# Patient Record
Sex: FEMALE | Race: WHITE | NOT HISPANIC OR LATINO | Employment: FULL TIME | URBAN - METROPOLITAN AREA
[De-identification: names, ages, dates, MRNs, and addresses within clinical notes are randomized per-mention and may not be internally consistent; named-entity substitution may affect disease eponyms.]

---

## 2018-06-13 DIAGNOSIS — E11.8 TYPE 2 DIABETES MELLITUS WITH COMPLICATION, UNSPECIFIED WHETHER LONG TERM INSULIN USE: Primary | ICD-10-CM

## 2018-07-13 ENCOUNTER — OFFICE VISIT (OUTPATIENT)
Dept: ENDOCRINOLOGY | Facility: HOSPITAL | Age: 53
End: 2018-07-13
Payer: COMMERCIAL

## 2018-07-13 VITALS
HEIGHT: 62 IN | SYSTOLIC BLOOD PRESSURE: 132 MMHG | HEART RATE: 72 BPM | WEIGHT: 183 LBS | DIASTOLIC BLOOD PRESSURE: 80 MMHG | BODY MASS INDEX: 33.68 KG/M2

## 2018-07-13 DIAGNOSIS — E78.5 HYPERLIPIDEMIA, UNSPECIFIED HYPERLIPIDEMIA TYPE: ICD-10-CM

## 2018-07-13 DIAGNOSIS — E11.8 TYPE 2 DIABETES MELLITUS WITH COMPLICATION, WITHOUT LONG-TERM CURRENT USE OF INSULIN (HCC): Primary | ICD-10-CM

## 2018-07-13 DIAGNOSIS — E11.8 TYPE 2 DIABETES MELLITUS WITH COMPLICATION, UNSPECIFIED WHETHER LONG TERM INSULIN USE: ICD-10-CM

## 2018-07-13 DIAGNOSIS — I10 ESSENTIAL HYPERTENSION: ICD-10-CM

## 2018-07-13 DIAGNOSIS — E03.9 HYPOTHYROIDISM, UNSPECIFIED TYPE: ICD-10-CM

## 2018-07-13 PROCEDURE — 99204 OFFICE O/P NEW MOD 45 MIN: CPT | Performed by: INTERNAL MEDICINE

## 2018-07-13 RX ORDER — LORAZEPAM 0.5 MG/1
TABLET ORAL
Refills: 0 | COMMUNITY
Start: 2018-06-01

## 2018-07-13 RX ORDER — ATORVASTATIN CALCIUM 40 MG/1
40 TABLET, FILM COATED ORAL DAILY
Qty: 30 TABLET | Refills: 11 | Status: SHIPPED | OUTPATIENT
Start: 2018-07-13 | End: 2019-04-24 | Stop reason: SDUPTHER

## 2018-07-13 RX ORDER — PIOGLITAZONEHYDROCHLORIDE 15 MG/1
15 TABLET ORAL DAILY
Qty: 30 TABLET | Refills: 11 | Status: SHIPPED | OUTPATIENT
Start: 2018-07-13 | End: 2019-04-24 | Stop reason: SDUPTHER

## 2018-07-13 RX ORDER — ATORVASTATIN CALCIUM 40 MG/1
40 TABLET, FILM COATED ORAL DAILY
Refills: 0 | COMMUNITY
Start: 2018-07-12 | End: 2018-07-13 | Stop reason: SDUPTHER

## 2018-07-13 RX ORDER — LOSARTAN POTASSIUM 50 MG/1
50 TABLET ORAL DAILY
Qty: 30 TABLET | Refills: 11 | Status: SHIPPED | OUTPATIENT
Start: 2018-07-13 | End: 2019-04-24 | Stop reason: SDUPTHER

## 2018-07-13 RX ORDER — EXENATIDE 2 MG/.65ML
2 INJECTION, SUSPENSION, EXTENDED RELEASE SUBCUTANEOUS WEEKLY
Refills: 1 | COMMUNITY
Start: 2018-06-11 | End: 2018-07-13 | Stop reason: SDUPTHER

## 2018-07-13 RX ORDER — PIOGLITAZONEHYDROCHLORIDE 15 MG/1
15 TABLET ORAL DAILY
Refills: 0 | COMMUNITY
Start: 2018-07-12 | End: 2018-07-13 | Stop reason: SDUPTHER

## 2018-07-13 RX ORDER — MULTIVITAMIN
1 CAPSULE ORAL DAILY
COMMUNITY

## 2018-07-13 RX ORDER — LEVOTHYROXINE SODIUM 0.15 MG/1
150 TABLET ORAL DAILY
Qty: 30 TABLET | Refills: 11 | Status: SHIPPED | OUTPATIENT
Start: 2018-07-13 | End: 2019-04-24 | Stop reason: SDUPTHER

## 2018-07-13 RX ORDER — LOSARTAN POTASSIUM 50 MG/1
50 TABLET ORAL DAILY
Refills: 0 | COMMUNITY
Start: 2018-05-29 | End: 2018-07-13 | Stop reason: SDUPTHER

## 2018-07-13 RX ORDER — BUPROPION HYDROCHLORIDE 300 MG/1
300 TABLET ORAL DAILY
Refills: 0 | COMMUNITY
Start: 2018-06-12

## 2018-07-13 RX ORDER — ZOLPIDEM TARTRATE 10 MG/1
TABLET ORAL
Refills: 0 | COMMUNITY
Start: 2018-06-01 | End: 2020-04-15 | Stop reason: ALTCHOICE

## 2018-07-13 RX ORDER — ASPIRIN 81 MG/1
81 TABLET ORAL DAILY
COMMUNITY

## 2018-07-13 RX ORDER — EXENATIDE 2 MG/.65ML
2 INJECTION, SUSPENSION, EXTENDED RELEASE SUBCUTANEOUS WEEKLY
Qty: 4 EACH | Refills: 11 | Status: SHIPPED | OUTPATIENT
Start: 2018-07-13 | End: 2019-02-11 | Stop reason: CLARIF

## 2018-07-13 RX ORDER — LEVOTHYROXINE SODIUM 0.15 MG/1
150 TABLET ORAL DAILY
Refills: 0 | COMMUNITY
Start: 2018-07-12 | End: 2018-07-13 | Stop reason: SDUPTHER

## 2018-07-13 NOTE — PROGRESS NOTES
7/13/2018    Assessment/Plan      Diagnoses and all orders for this visit:    Type 2 diabetes mellitus with complication, without long-term current use of insulin ([facility] )  -     HEMOGLOBIN A1C W/ EAG ESTIMATION Lab Collect; Future  -     Comprehensive metabolic panel Lab Collect; Future  -     Microalbumin / creatinine urine ratio- Lab Collect; Future  -     pioglitazone (ACTOS) 15 mg tablet; Take 1 tablet (15 mg total) by mouth daily  -     HEMOGLOBIN A1C W/ EAG ESTIMATION Lab Collect; Future  -     BYDUREON 2 MG PEN; Inject 2 mg under the skin once a week    Hypothyroidism, unspecified type  -     TSH, 3rd generation Lab Collect; Future  -     T4, free Lab Collect; Future  -     levothyroxine 150 mcg tablet; Take 1 tablet (150 mcg total) by mouth daily  -     TSH, 3rd generation Lab Collect; Future  -     T4, free Lab Collect; Future    Hyperlipidemia, unspecified hyperlipidemia type  -     Lipid Panel with Direct LDL reflex Lab Collect; Future  -     atorvastatin (LIPITOR) 40 mg tablet; Take 1 tablet (40 mg total) by mouth daily    Essential hypertension  -     losartan (COZAAR) 50 mg tablet; Take 1 tablet (50 mg total) by mouth daily    Type 2 diabetes mellitus with complication, unspecified whether long term insulin use (HCC)  -     canagliflozin (INVOKANA) 100 mg; Take 1 tablet (100 mg total) by mouth daily before breakfast    Other orders  -     Discontinue: atorvastatin (LIPITOR) 40 mg tablet; Take 40 mg by mouth daily  -     buPROPion (WELLBUTRIN XL) 300 mg 24 hr tablet; Take 300 mg by mouth daily  -     Discontinue: BYDUREON 2 MG PEN; Inject 2 mg under the skin once a week  -     Discontinue: levothyroxine 150 mcg tablet; Take 150 mcg by mouth daily  -     LORazepam (ATIVAN) 0 5 mg tablet;   -     Discontinue: losartan (COZAAR) 50 mg tablet; Take 50 mg by mouth daily  -     Discontinue: pioglitazone (ACTOS) 15 mg tablet;  Take 15 mg by mouth daily  -     zolpidem (AMBIEN) 10 mg tablet;   -     aspirin (ECOTRIN LOW STRENGTH) 81 mg EC tablet; Take 81 mg by mouth daily  -     Multiple Vitamin (MULTIVITAMIN) capsule; Take 1 capsule by mouth daily        Assessment/Plan:  1  Type 2 diabetes without complication:  Will check labs as ordered above and call her with the results  Continue Actos 15 mg daily, Invokana 100 mg daily and Bydureon 2 mg daily  We switch to the bcise pen for Bydureon  Plan to follow-up in 6 months, but if there are any abnormalities in her blood work we can see her back sooner  2   Hypertension:  Continue losartan 50 mg daily  3   Hyperlipidemia:  Continue Lipitor 40 mg daily  4   Hypothyroidism:  Continue levothyroxine 150 mcg daily and check TSH free T4  We will call her with these results  CC: Diabetes Consult    History of Present Illness     HPI: Perez Christianson is a 47y o  year old female with type 2 diabetes for about 10 years  She is on oral agents at home and takes Actos 15 mg daily, Invokana 100 mg daily, Bydureon 2 mg weekly  She denies any polyuria, polydipsia, nocturia and blurry vision  She denies neuropathy, nephropathy and retinopathy  In the past has been on Metformin (GI side effects) and Januvia  Hypoglycemic episodes: Yes but has glucose tabs in case  Follows with eye doctor annually  Blood Sugar/Glucometer/Pump/CGM review: No logs to review today  HTN/HLD/Hypothyroid: Takes losartan 50 mg daily for blood pressure  Atorvastatin 40 mg daily for cholesterol  She is on 150 mcg of levothyroxine daily  Clinically she is doing well and denies any significant symptoms of hypothyroidism or hyperthyroidism  Review of Systems   Constitutional: Negative for chills, fatigue and fever  HENT: Negative for trouble swallowing and voice change  Eyes: Negative for visual disturbance  Respiratory: Negative for shortness of breath  Cardiovascular: Negative for chest pain, palpitations and leg swelling     Gastrointestinal: Negative for abdominal pain, nausea and vomiting  Endocrine: Negative for polydipsia and polyuria  Musculoskeletal: Negative for arthralgias and myalgias  Skin: Negative for rash  Neurological: Negative for dizziness, tremors and weakness  Hematological: Negative for adenopathy  Psychiatric/Behavioral: Negative for agitation and confusion  Historical Information   History reviewed  No pertinent past medical history  History reviewed  No pertinent surgical history  Social History   History   Alcohol use Not on file     History   Drug use: Unknown     History   Smoking Status    Current Every Day Smoker    Packs/day: 0 50    Years: 30 00    Types: Cigarettes   Smokeless Tobacco    Never Used     Family History:   Family History   Problem Relation Age of Onset    Schizoaffective Disorder  Mother     Diabetes unspecified Father     Hyperlipidemia Father     Hypertension Father        Meds/Allergies   Current Outpatient Prescriptions   Medication Sig Dispense Refill    aspirin (ECOTRIN LOW STRENGTH) 81 mg EC tablet Take 81 mg by mouth daily      atorvastatin (LIPITOR) 40 mg tablet Take 1 tablet (40 mg total) by mouth daily 30 tablet 11    buPROPion (WELLBUTRIN XL) 300 mg 24 hr tablet Take 300 mg by mouth daily  0    BYDUREON 2 MG PEN Inject 2 mg under the skin once a week 4 each 11    canagliflozin (INVOKANA) 100 mg Take 1 tablet (100 mg total) by mouth daily before breakfast 30 tablet 11    levothyroxine 150 mcg tablet Take 1 tablet (150 mcg total) by mouth daily 30 tablet 11    LORazepam (ATIVAN) 0 5 mg tablet   0    losartan (COZAAR) 50 mg tablet Take 1 tablet (50 mg total) by mouth daily 30 tablet 11    Multiple Vitamin (MULTIVITAMIN) capsule Take 1 capsule by mouth daily      pioglitazone (ACTOS) 15 mg tablet Take 1 tablet (15 mg total) by mouth daily 30 tablet 11    zolpidem (AMBIEN) 10 mg tablet   0     No current facility-administered medications for this visit        No Known Allergies    Objective   Vitals: Blood pressure 132/80, pulse 72, height 5' 2" (1 575 m), weight 83 kg (183 lb)  Invasive Devices          No matching active lines, drains, or airways          Physical Exam   Constitutional: She is oriented to person, place, and time  She appears well-developed and well-nourished  No distress  HENT:   Head: Normocephalic and atraumatic  Mouth/Throat: No oropharyngeal exudate  Eyes: Conjunctivae and EOM are normal  Pupils are equal, round, and reactive to light  No scleral icterus  Neck: Normal range of motion  Neck supple  No thyromegaly present  Cardiovascular: Normal rate and regular rhythm  No murmur heard  Pulses:       Dorsalis pedis pulses are 2+ on the right side, and 2+ on the left side  Posterior tibial pulses are 2+ on the right side, and 2+ on the left side  Pulmonary/Chest: Effort normal and breath sounds normal  She has no wheezes  Abdominal: Soft  Bowel sounds are normal  She exhibits no distension  There is no tenderness  Musculoskeletal: Normal range of motion  She exhibits no edema  Feet:   Right Foot:   Skin Integrity: Negative for ulcer, skin breakdown, erythema, warmth, callus or dry skin  Left Foot:   Skin Integrity: Negative for ulcer, skin breakdown, erythema, warmth, callus or dry skin  Neurological: She is alert and oriented to person, place, and time  She exhibits normal muscle tone  Skin: Skin is warm and dry  No rash noted  She is not diaphoretic  Psychiatric: She has a normal mood and affect  Her behavior is normal      Patient's shoes and socks removed  Right Foot/Ankle   Right Foot Inspection  Skin Exam: skin normal and skin intact no dry skin, no warmth, no callus, no erythema, no maceration, no abnormal color, no pre-ulcer, no ulcer and no callus                            Sensory   Vibration: intact    Monofilament testing: intact  Vascular    The right DP pulse is 2+  The right PT pulse is 2+       Left Foot/Ankle  Left Foot Inspection  Skin Exam: skin normal and skin intactno dry skin, no warmth, no erythema, no maceration, normal color, no pre-ulcer, no ulcer and no callus                                         Sensory   Vibration: intact    Monofilament: intact  Vascular    The left DP pulse is 2+  The left PT pulse is 2+  The history was obtained from the review of the chart and from the patient  Lab Results:   No recent labs  No future appointments

## 2018-07-13 NOTE — LETTER
July 13, 2018     [address] [facility] [address]  [address] [facility]    Patient: Carson Winters   YOB: 1965   Date of Visit: 7/13/2018       Dear Dr Lemmie Goldberg: Thank you for referring Bran Schneider to me for evaluation  Below are my notes for this consultation  If you have questions, please do not hesitate to call me  I look forward to following your patient along with you  Sincerely,        Miles Coronado DO        CC: No Recipients  Miles Coronado DO  7/13/2018 11:37 AM  Sign at close encounter  7/13/2018    Assessment/Plan      Diagnoses and all orders for this visit:    Type 2 diabetes mellitus with complication, without long-term current use of insulin ([facility] [address] )  -     HEMOGLOBIN A1C W/ EAG ESTIMATION Lab Collect; Future  -     Comprehensive metabolic panel Lab Collect; Future  -     Microalbumin / creatinine urine ratio- Lab Collect; Future  -     pioglitazone (ACTOS) 15 mg tablet; Take 1 tablet (15 mg total) by mouth daily  -     HEMOGLOBIN A1C W/ EAG ESTIMATION Lab Collect; Future  -     BYDUREON 2 MG PEN; Inject 2 mg under the skin once a week    Hypothyroidism, unspecified type  -     TSH, 3rd generation Lab Collect; Future  -     T4, free Lab Collect; Future  -     levothyroxine 150 mcg tablet; Take 1 tablet (150 mcg total) by mouth daily  -     TSH, 3rd generation Lab Collect; Future  -     T4, free Lab Collect; Future    Hyperlipidemia, unspecified hyperlipidemia type  -     Lipid Panel with Direct LDL reflex Lab Collect; Future  -     atorvastatin (LIPITOR) 40 mg tablet; Take 1 tablet (40 mg total) by mouth daily    Essential hypertension  -     losartan (COZAAR) 50 mg tablet; Take 1 tablet (50 mg total) by mouth daily    Type 2 diabetes mellitus with complication, unspecified whether long term insulin use (HCC)  -     canagliflozin (INVOKANA) 100 mg;  Take 1 tablet (100 mg total) by mouth daily before breakfast    Other orders  -     Discontinue: atorvastatin (LIPITOR) 40 mg tablet; Take 40 mg by mouth daily  -     buPROPion (WELLBUTRIN XL) 300 mg 24 hr tablet; Take 300 mg by mouth daily  -     Discontinue: BYDUREON 2 MG PEN; Inject 2 mg under the skin once a week  -     Discontinue: levothyroxine 150 mcg tablet; Take 150 mcg by mouth daily  -     LORazepam (ATIVAN) 0 5 mg tablet;   -     Discontinue: losartan (COZAAR) 50 mg tablet; Take 50 mg by mouth daily  -     Discontinue: pioglitazone (ACTOS) 15 mg tablet; Take 15 mg by mouth daily  -     zolpidem (AMBIEN) 10 mg tablet;   -     aspirin (ECOTRIN LOW STRENGTH) 81 mg EC tablet; Take 81 mg by mouth daily  -     Multiple Vitamin (MULTIVITAMIN) capsule; Take 1 capsule by mouth daily        Assessment/Plan:  1  Type 2 diabetes without complication:  Will check labs as ordered above and call her with the results  Continue Actos 15 mg daily, Invokana 100 mg daily and Bydureon 2 mg daily  We switch to the bcise pen for Bydureon  Plan to follow-up in 6 months, but if there are any abnormalities in her blood work we can see her back sooner  2   Hypertension:  Continue losartan 50 mg daily  3   Hyperlipidemia:  Continue Lipitor 40 mg daily  4   Hypothyroidism:  Continue levothyroxine 150 mcg daily and check TSH free T4  We will call her with these results  CC: Diabetes Consult    History of Present Illness     HPI: Keshia Baptiste is a 47y o  year old female with type 2 diabetes for about 10 years  She is on oral agents at home and takes Actos 15 mg daily, Invokana 100 mg daily, Bydureon 2 mg weekly  She denies any polyuria, polydipsia, nocturia and blurry vision  She denies neuropathy, nephropathy and retinopathy  In the past has been on Metformin (GI side effects) and Januvia  Hypoglycemic episodes: Yes but has glucose tabs in case  Follows with eye doctor annually  Blood Sugar/Glucometer/Pump/CGM review: No logs to review today      HTN/HLD/Hypothyroid: Takes losartan 50 mg daily for blood pressure  Atorvastatin 40 mg daily for cholesterol  She is on 150 mcg of levothyroxine daily  Clinically she is doing well and denies any significant symptoms of hypothyroidism or hyperthyroidism  Review of Systems   Constitutional: Negative for chills, fatigue and fever  HENT: Negative for trouble swallowing and voice change  Eyes: Negative for visual disturbance  Respiratory: Negative for shortness of breath  Cardiovascular: Negative for chest pain, palpitations and leg swelling  Gastrointestinal: Negative for abdominal pain, nausea and vomiting  Endocrine: Negative for polydipsia and polyuria  Musculoskeletal: Negative for arthralgias and myalgias  Skin: Negative for rash  Neurological: Negative for dizziness, tremors and weakness  Hematological: Negative for adenopathy  Psychiatric/Behavioral: Negative for agitation and confusion  Historical Information   History reviewed  No pertinent past medical history  History reviewed  No pertinent surgical history    Social History   History   Alcohol use Not on file     History   Drug use: Unknown     History   Smoking Status    Current Every Day Smoker    Packs/day: 0 50    Years: 30 00    Types: Cigarettes   Smokeless Tobacco    Never Used     Family History:   Family History   Problem Relation Age of Onset    Schizoaffective Disorder  Mother     Diabetes unspecified Father     Hyperlipidemia Father     Hypertension Father        Meds/Allergies   Current Outpatient Prescriptions   Medication Sig Dispense Refill    aspirin (ECOTRIN LOW STRENGTH) 81 mg EC tablet Take 81 mg by mouth daily      atorvastatin (LIPITOR) 40 mg tablet Take 1 tablet (40 mg total) by mouth daily 30 tablet 11    buPROPion (WELLBUTRIN XL) 300 mg 24 hr tablet Take 300 mg by mouth daily  0    BYDUREON 2 MG PEN Inject 2 mg under the skin once a week 4 each 11    canagliflozin (INVOKANA) 100 mg Take 1 tablet (100 mg total) by mouth daily before breakfast 30 tablet 11    levothyroxine 150 mcg tablet Take 1 tablet (150 mcg total) by mouth daily 30 tablet 11    LORazepam (ATIVAN) 0 5 mg tablet   0    losartan (COZAAR) 50 mg tablet Take 1 tablet (50 mg total) by mouth daily 30 tablet 11    Multiple Vitamin (MULTIVITAMIN) capsule Take 1 capsule by mouth daily      pioglitazone (ACTOS) 15 mg tablet Take 1 tablet (15 mg total) by mouth daily 30 tablet 11    zolpidem (AMBIEN) 10 mg tablet   0     No current facility-administered medications for this visit  No Known Allergies    Objective   Vitals: Blood pressure 132/80, pulse 72, height 5' 2" (1 575 m), weight 83 kg (183 lb)  Invasive Devices          No matching active lines, drains, or airways          Physical Exam   Constitutional: She is oriented to person, place, and time  She appears well-developed and well-nourished  No distress  HENT:   Head: Normocephalic and atraumatic  Mouth/Throat: No oropharyngeal exudate  Eyes: Conjunctivae and EOM are normal  Pupils are equal, round, and reactive to light  No scleral icterus  Neck: Normal range of motion  Neck supple  No thyromegaly present  Cardiovascular: Normal rate and regular rhythm  No murmur heard  Pulses:       Dorsalis pedis pulses are 2+ on the right side, and 2+ on the left side  Posterior tibial pulses are 2+ on the right side, and 2+ on the left side  Pulmonary/Chest: Effort normal and breath sounds normal  She has no wheezes  Abdominal: Soft  Bowel sounds are normal  She exhibits no distension  There is no tenderness  Musculoskeletal: Normal range of motion  She exhibits no edema  Feet:   Right Foot:   Skin Integrity: Negative for ulcer, skin breakdown, erythema, warmth, callus or dry skin  Left Foot:   Skin Integrity: Negative for ulcer, skin breakdown, erythema, warmth, callus or dry skin  Neurological: She is alert and oriented to person, place, and time  She exhibits normal muscle tone     Skin: Skin is warm and dry  No rash noted  She is not diaphoretic  Psychiatric: She has a normal mood and affect  Her behavior is normal      Patient's shoes and socks removed  Right Foot/Ankle   Right Foot Inspection  Skin Exam: skin normal and skin intact no dry skin, no warmth, no callus, no erythema, no maceration, no abnormal color, no pre-ulcer, no ulcer and no callus                            Sensory   Vibration: intact    Monofilament testing: intact  Vascular    The right DP pulse is 2+  The right PT pulse is 2+  Left Foot/Ankle  Left Foot Inspection  Skin Exam: skin normal and skin intactno dry skin, no warmth, no erythema, no maceration, normal color, no pre-ulcer, no ulcer and no callus                                         Sensory   Vibration: intact    Monofilament: intact  Vascular    The left DP pulse is 2+  The left PT pulse is 2+  The history was obtained from the review of the chart and from the patient  Lab Results:   No recent labs  No future appointments

## 2019-01-30 ENCOUNTER — TELEPHONE (OUTPATIENT)
Dept: ENDOCRINOLOGY | Facility: HOSPITAL | Age: 54
End: 2019-01-30

## 2019-01-30 LAB
ALBUMIN SERPL-MCNC: 4.8 G/DL (ref 3.5–5.5)
ALBUMIN/CREAT UR: 21.7 MG/G CREAT (ref 0–30)
ALBUMIN/GLOB SERPL: 1.9 {RATIO} (ref 1.2–2.2)
ALP SERPL-CCNC: 74 IU/L (ref 39–117)
ALT SERPL-CCNC: 33 IU/L (ref 0–32)
AST SERPL-CCNC: 22 IU/L (ref 0–40)
BILIRUB SERPL-MCNC: 0.6 MG/DL (ref 0–1.2)
BUN SERPL-MCNC: 16 MG/DL (ref 6–24)
BUN/CREAT SERPL: 21 (ref 9–23)
CALCIUM SERPL-MCNC: 9.7 MG/DL (ref 8.7–10.2)
CHLORIDE SERPL-SCNC: 100 MMOL/L (ref 96–106)
CHOLEST SERPL-MCNC: 188 MG/DL (ref 100–199)
CO2 SERPL-SCNC: 24 MMOL/L (ref 20–29)
CREAT SERPL-MCNC: 0.76 MG/DL (ref 0.57–1)
CREAT UR-MCNC: 170.5 MG/DL
EST. AVERAGE GLUCOSE BLD GHB EST-MCNC: 177 MG/DL
GLOBULIN SER-MCNC: 2.5 G/DL (ref 1.5–4.5)
GLUCOSE SERPL-MCNC: 152 MG/DL (ref 65–99)
HBA1C MFR BLD: 7.8 % (ref 4.8–5.6)
HDLC SERPL-MCNC: 45 MG/DL
LDLC SERPL CALC-MCNC: 93 MG/DL (ref 0–99)
MICROALBUMIN UR-MCNC: 37 UG/ML
POTASSIUM SERPL-SCNC: 4.3 MMOL/L (ref 3.5–5.2)
PROT SERPL-MCNC: 7.3 G/DL (ref 6–8.5)
SL AMB EGFR AFRICAN AMERICAN: 103 ML/MIN/1.73
SL AMB EGFR NON AFRICAN AMERICAN: 89 ML/MIN/1.73
SL AMB VLDL CHOLESTEROL CALC: 50 MG/DL (ref 5–40)
SODIUM SERPL-SCNC: 142 MMOL/L (ref 134–144)
T4 FREE SERPL-MCNC: 1.55 NG/DL (ref 0.82–1.77)
TRIGL SERPL-MCNC: 250 MG/DL (ref 0–149)
TSH SERPL DL<=0.005 MIU/L-ACNC: 5.34 UIU/ML (ref 0.45–4.5)

## 2019-01-30 NOTE — TELEPHONE ENCOUNTER
Patient no-showed for appt today as she got times confused  She did come in at 2:30 and we rescheduled for 2/27/19  She is requesting samples of invokana 100 mg  She would like to have 4 samples if possible

## 2019-02-11 DIAGNOSIS — E11.8 TYPE 2 DIABETES MELLITUS WITH COMPLICATION, WITHOUT LONG-TERM CURRENT USE OF INSULIN (HCC): Primary | ICD-10-CM

## 2019-02-27 ENCOUNTER — OFFICE VISIT (OUTPATIENT)
Dept: ENDOCRINOLOGY | Facility: HOSPITAL | Age: 54
End: 2019-02-27
Payer: COMMERCIAL

## 2019-02-27 VITALS
HEART RATE: 108 BPM | DIASTOLIC BLOOD PRESSURE: 70 MMHG | WEIGHT: 188.2 LBS | HEIGHT: 62 IN | SYSTOLIC BLOOD PRESSURE: 110 MMHG | BODY MASS INDEX: 34.63 KG/M2

## 2019-02-27 DIAGNOSIS — I10 ESSENTIAL HYPERTENSION: ICD-10-CM

## 2019-02-27 DIAGNOSIS — E03.9 HYPOTHYROIDISM, UNSPECIFIED TYPE: ICD-10-CM

## 2019-02-27 DIAGNOSIS — E11.8 TYPE 2 DIABETES MELLITUS WITH COMPLICATION, WITHOUT LONG-TERM CURRENT USE OF INSULIN (HCC): Primary | ICD-10-CM

## 2019-02-27 DIAGNOSIS — E78.5 HYPERLIPIDEMIA, UNSPECIFIED HYPERLIPIDEMIA TYPE: ICD-10-CM

## 2019-02-27 PROCEDURE — 99214 OFFICE O/P EST MOD 30 MIN: CPT | Performed by: INTERNAL MEDICINE

## 2019-02-27 NOTE — PROGRESS NOTES
2/27/2019    Assessment/Plan      Diagnoses and all orders for this visit:    Type 2 diabetes mellitus with complication, without long-term current use of insulin (HCC)  -     Canagliflozin 300 MG TABS; Take 1 tablet (300 mg total) by mouth daily  -     HEMOGLOBIN A1C W/ EAG ESTIMATION Lab Collect; Future  -     Comprehensive metabolic panel Lab Collect; Future    Hypothyroidism, unspecified type  -     TSH, 3rd generation Lab Collect; Future  -     T4, free Lab Collect; Future  -     Miscellaneous Lab Test- Lab Collect; Future    Essential hypertension    Hyperlipidemia, unspecified hyperlipidemia type        Assessment/Plan:  1  Type 2 diabetes:  A1c above goal at 7 8  She has not been as active and this A1c includes holiday time period  I suggested we increase Invokana 300 mg daily  Continue other medications as is  Follow-up in 3 months with labs as ordered above just prior  She will call sooner with any questions or concerns  2  Hypothyroidism:  Clinically she is feeling well  Recent TSH from [facility] was slightly elevated  Continue current levothyroxine  We will plan to repeat a TSH free T4 along with a TSH by [facility] prior to next appointment  3  Hypertension:  Normotensive in the office today  4  Hyperlipidemia:  Continue atorvastatin  CC: Diabetes Consult    History of Present Illness     HPI: Silvia Hurst is a 46y o  year old female with type 2 diabetes for 10 years  She is on oral agents at home and takes Actos 15 mg daily, Bydureon 2 mg weekly, Invokana 100 mg daily  She denies any polyuria, polydipsia, nocturia and blurry vision  She denies neuropathy, nephropathy and retinopathy  In the past did not tolerate metformin and Januvia  Hypoglycemic episodes: No      Follows with eye doctor yearly  The patient's last foot exam was in July 2018  Blood Sugar/Glucometer/Pump/CGM review: No Logs to review  For hyperlipidemia takes atorvastatin 40 mg daily    For hypothyroidism she takes levothyroxine 150 mcg daily  For hypertension she takes losartan 50 mg daily  Review of Systems   Constitutional: Negative for fatigue  HENT: Negative for trouble swallowing and voice change  Eyes: Negative for visual disturbance  Respiratory: Negative for shortness of breath  Cardiovascular: Negative for palpitations and leg swelling  Gastrointestinal: Negative for abdominal pain, nausea and vomiting  Endocrine: Negative for polydipsia and polyuria  Musculoskeletal: Negative for arthralgias and myalgias  Skin: Negative for rash  Neurological: Negative for dizziness, tremors and weakness  Hematological: Negative for adenopathy  Psychiatric/Behavioral: Negative for agitation and confusion  Historical Information   History reviewed  No pertinent past medical history  History reviewed  No pertinent surgical history    Social History   Social History     Substance and Sexual Activity   Alcohol Use Not on file     Social History     Substance and Sexual Activity   Drug Use Not on file     Social History     Tobacco Use   Smoking Status Current Every Day Smoker    Packs/day: 0 50    Years: 30 00    Pack years: 15 00    Types: Cigarettes   Smokeless Tobacco Never Used     Family History:   Family History   Problem Relation Age of Onset    Schizoaffective Disorder  Mother     Diabetes unspecified Father     Hyperlipidemia Father     Hypertension Father        Meds/Allergies   Current Outpatient Medications   Medication Sig Dispense Refill    aspirin (ECOTRIN LOW STRENGTH) 81 mg EC tablet Take 81 mg by mouth daily      atorvastatin (LIPITOR) 40 mg tablet Take 1 tablet (40 mg total) by mouth daily 30 tablet 11    buPROPion (WELLBUTRIN XL) 300 mg 24 hr tablet Take 300 mg by mouth daily  0    Exenatide ER (BYDUREON VAUGHN) 2 MG/0 85ML AUIJ Inject 2 mg under the skin once a week 4 pen 5    levothyroxine 150 mcg tablet Take 1 tablet (150 mcg total) by mouth daily 30 tablet 11    LORazepam (ATIVAN) 0 5 mg tablet   0    losartan (COZAAR) 50 mg tablet Take 1 tablet (50 mg total) by mouth daily 30 tablet 11    Multiple Vitamin (MULTIVITAMIN) capsule Take 1 capsule by mouth daily      pioglitazone (ACTOS) 15 mg tablet Take 1 tablet (15 mg total) by mouth daily 30 tablet 11    zolpidem (AMBIEN) 10 mg tablet   0    Canagliflozin 300 MG TABS Take 1 tablet (300 mg total) by mouth daily 30 tablet 11     No current facility-administered medications for this visit  No Known Allergies    Objective   Vitals: Blood pressure 110/70, pulse (!) 108, height 5' 2" (1 575 m), weight 85 4 kg (188 lb 3 2 oz)  Invasive Devices          None          Physical Exam   Constitutional: She is oriented to person, place, and time  She appears well-developed and well-nourished  No distress  HENT:   Head: Normocephalic and atraumatic  Eyes: Pupils are equal, round, and reactive to light  Conjunctivae are normal    Neck: Normal range of motion  Neck supple  No thyromegaly present  Cardiovascular: Normal rate and regular rhythm  Pulmonary/Chest: Effort normal and breath sounds normal  No respiratory distress  Abdominal: Soft  Bowel sounds are normal    Musculoskeletal: Normal range of motion  She exhibits no edema  Neurological: She is alert and oriented to person, place, and time  She exhibits normal muscle tone  Skin: Skin is warm and dry  No rash noted  She is not diaphoretic  Psychiatric: She has a normal mood and affect  Her behavior is normal    Vitals reviewed  The history was obtained from the review of the chart and from the patient      Lab Results:    Most recent Alc is  Lab Results   Component Value Date    HGBA1C 7 8 (H) 01/29/2019           No components found for: HA1C  No components found for: GLU    Lab Results   Component Value Date    BUN 16 01/29/2019    K 4 3 01/29/2019     01/29/2019    CO2 24 01/29/2019     No results found for: EGFR  No components found for: MALBCRER    Lab Results   Component Value Date    HDL 45 01/29/2019    TRIG 250 (H) 01/29/2019       Lab Results   Component Value Date    ALT 33 (H) 01/29/2019    AST 22 01/29/2019       Lab Results   Component Value Date    TSH 5 340 (H) 01/29/2019    FREET4 1 55 01/29/2019       Recent Results (from the past 68711 hour(s))   Comprehensive metabolic panel    Collection Time: 01/29/19  8:30 AM   Result Value Ref Range    Glucose, Random 152 (H) 65 - 99 mg/dL    BUN 16 6 - 24 mg/dL    Creatinine 0 76 0 57 - 1 00 mg/dL    eGFR Non  89 >59 mL/min/1 73    eGFR  103 >59 mL/min/1 73    SL AMB BUN/CREATININE RATIO 21 9 - 23    Sodium 142 134 - 144 mmol/L    Potassium 4 3 3 5 - 5 2 mmol/L    Chloride 100 96 - 106 mmol/L    CO2 24 20 - 29 mmol/L    CALCIUM 9 7 8 7 - 10 2 mg/dL    Protein, Total 7 3 6 0 - 8 5 g/dL    Albumin 4 8 3 5 - 5 5 g/dL    Globulin, Total 2 5 1 5 - 4 5 g/dL    Albumin/Globulin Ratio 1 9 1 2 - 2 2    TOTAL BILIRUBIN 0 6 0 0 - 1 2 mg/dL    Alk Phos Isoenzymes 74 39 - 117 IU/L    AST 22 0 - 40 IU/L    ALT 33 (H) 0 - 32 IU/L   Lipid Panel with Direct LDL reflex    Collection Time: 01/29/19  8:30 AM   Result Value Ref Range    Cholesterol, Total 188 100 - 199 mg/dL    Triglycerides 250 (H) 0 - 149 mg/dL    HDL 45 >39 mg/dL    VLDL Cholesterol Calculated 50 (H) 5 - 40 mg/dL    LDL Direct 93 0 - 99 mg/dL   Microalbumin / creatinine urine ratio    Collection Time: 01/29/19  8:30 AM   Result Value Ref Range    Creatinine, Urine 170 5 Not Estab  mg/dL    Microalbum  ,U,Random 37 0 Not Estab  ug/mL    Microalb/Creat Ratio 21 7 0 0 - 30 0 mg/g creat   Hemoglobin A1C    Collection Time: 01/29/19  8:30 AM   Result Value Ref Range    Hemoglobin A1C 7 8 (H) 4 8 - 5 6 %    Estimated Average Glucose 177 mg/dL   T4, free    Collection Time: 01/29/19  8:30 AM   Result Value Ref Range    Free t4 1 55 0 82 - 1 77 ng/dL   TSH, 3rd generation    Collection Time: 01/29/19  8:30 AM   Result Value Ref Range    TSH 5 340 (H) 0 450 - 4 500 uIU/mL         No future appointments  Portions of the record may have been created with voice recognition software  Occasional wrong word or "sound a like" substitutions may have occurred due to the inherent limitations of voice recognition software  Read the chart carefully and recognize, using context, where substitutions have occurred

## 2019-04-24 DIAGNOSIS — E78.5 HYPERLIPIDEMIA, UNSPECIFIED HYPERLIPIDEMIA TYPE: ICD-10-CM

## 2019-04-24 DIAGNOSIS — E11.8 TYPE 2 DIABETES MELLITUS WITH COMPLICATION, WITHOUT LONG-TERM CURRENT USE OF INSULIN (HCC): ICD-10-CM

## 2019-04-24 DIAGNOSIS — I10 ESSENTIAL HYPERTENSION: ICD-10-CM

## 2019-04-24 DIAGNOSIS — E03.9 HYPOTHYROIDISM, UNSPECIFIED TYPE: ICD-10-CM

## 2019-04-24 RX ORDER — ATORVASTATIN CALCIUM 40 MG/1
40 TABLET, FILM COATED ORAL DAILY
Qty: 90 TABLET | Refills: 1 | Status: SHIPPED | OUTPATIENT
Start: 2019-04-24 | End: 2020-02-28

## 2019-04-24 RX ORDER — LOSARTAN POTASSIUM 50 MG/1
50 TABLET ORAL DAILY
Qty: 90 TABLET | Refills: 1 | Status: SHIPPED | OUTPATIENT
Start: 2019-04-24 | End: 2020-02-28

## 2019-04-24 RX ORDER — PIOGLITAZONEHYDROCHLORIDE 15 MG/1
15 TABLET ORAL DAILY
Qty: 90 TABLET | Refills: 1 | Status: SHIPPED | OUTPATIENT
Start: 2019-04-24 | End: 2020-02-28

## 2019-04-24 RX ORDER — LEVOTHYROXINE SODIUM 0.15 MG/1
150 TABLET ORAL DAILY
Qty: 90 TABLET | Refills: 1 | Status: SHIPPED | OUTPATIENT
Start: 2019-04-24 | End: 2020-02-28

## 2019-07-24 DIAGNOSIS — E11.8 TYPE 2 DIABETES MELLITUS WITH COMPLICATION, WITHOUT LONG-TERM CURRENT USE OF INSULIN (HCC): ICD-10-CM

## 2019-07-24 RX ORDER — EXENATIDE 2 MG/.65ML
2 INJECTION, SUSPENSION, EXTENDED RELEASE SUBCUTANEOUS WEEKLY
Qty: 4 EACH | Refills: 11 | Status: SHIPPED | OUTPATIENT
Start: 2019-07-24 | End: 2020-04-15 | Stop reason: CLARIF

## 2019-10-01 LAB — HBA1C MFR BLD HPLC: 8 %

## 2019-10-03 ENCOUNTER — OFFICE VISIT (OUTPATIENT)
Dept: ENDOCRINOLOGY | Facility: HOSPITAL | Age: 54
End: 2019-10-03
Payer: COMMERCIAL

## 2019-10-03 VITALS
WEIGHT: 187 LBS | BODY MASS INDEX: 34.41 KG/M2 | SYSTOLIC BLOOD PRESSURE: 134 MMHG | HEIGHT: 62 IN | DIASTOLIC BLOOD PRESSURE: 80 MMHG | HEART RATE: 108 BPM

## 2019-10-03 DIAGNOSIS — I10 ESSENTIAL HYPERTENSION: ICD-10-CM

## 2019-10-03 DIAGNOSIS — E03.9 HYPOTHYROIDISM, UNSPECIFIED TYPE: ICD-10-CM

## 2019-10-03 DIAGNOSIS — E11.8 TYPE 2 DIABETES MELLITUS WITH COMPLICATION, WITHOUT LONG-TERM CURRENT USE OF INSULIN (HCC): Primary | ICD-10-CM

## 2019-10-03 DIAGNOSIS — E78.5 HYPERLIPIDEMIA, UNSPECIFIED HYPERLIPIDEMIA TYPE: ICD-10-CM

## 2019-10-03 LAB
LEFT EYE DIABETIC RETINOPATHY: NORMAL
RIGHT EYE DIABETIC RETINOPATHY: NORMAL

## 2019-10-03 PROCEDURE — 3079F DIAST BP 80-89 MM HG: CPT | Performed by: NURSE PRACTITIONER

## 2019-10-03 PROCEDURE — 3075F SYST BP GE 130 - 139MM HG: CPT | Performed by: NURSE PRACTITIONER

## 2019-10-03 PROCEDURE — 99214 OFFICE O/P EST MOD 30 MIN: CPT | Performed by: NURSE PRACTITIONER

## 2019-10-03 NOTE — PATIENT INSTRUCTIONS
Be mindful of diet  Stay active and stay hydrated  Check your blood sugars regularly and send a record to the office in 2 weeks for review  Continue levothyroxine at current dose  Continue losartan and atorvastatin  Complete lab work as prescribed  We will contact you with the results

## 2019-10-03 NOTE — PROGRESS NOTES
Maurice Cotter 53 y o  female MRN: [id number]    Encounter: [id number]      Assessment/Plan     Assessment: This is a 53y o -year-old female with     Plan:  1  Type 2 diabetes:  She presents with no lab work completed as prescribed at her last visit  She also presents with no blood sugar records or meter for download  For now, she will continue her current regimen  I have asked her to check her blood sugars at least twice daily at alternating times and send a record to the office in 2 weeks for review  Check hemoglobin A1c      2  Hypothyroidism:  She complains of fatigue  Check TSH and free T4  We will contact her with results and any applicable changes to her levothyroxine regimen, if necessary  3  Hypertension:  Normotensive in the office today  Check comprehensive metabolic panel      4  Hyperlipidemia:  Continue atorvastatin  Check fasting lipid panel  CC:  Type 2 Diabetes follow-up    History of Present Illness     HPI:  53y o  year old female with type 2 diabetes for 10 years  She is on oral agents at home and takes Actos 15 mg daily, Bydureon 2 mg weekly, Invokana 300 mg daily  She denies any polyuria, polydipsia, nocturia and blurry vision  She denies neuropathy, nephropathy and retinopathy  She states that she did not tolerate metformin or Januvia in the past        Hypoglycemic episodes: None       Most recent diabetic eye exam was obtained on October 2, 2019 with mild stable retinopathy in the right eye  She has no complaints about her feet and does not follow Podiatry for regular diabetic foot care      Blood Sugar/Glucometer/Pump/CGM review:  She presents with no blood sugar records or meter for download in the office today      For hyperlipidemia takes atorvastatin 40 mg daily  For hypothyroidism she takes levothyroxine 150 mcg daily  She complains of some fatigue      For hypertension she takes losartan 50 mg daily       Review of Systems   Constitutional: Positive for fatigue  Negative for chills and fever  HENT: Negative  Negative for trouble swallowing and voice change  Eyes: Negative  Negative for visual disturbance  Respiratory: Negative  Negative for chest tightness and shortness of breath  Cardiovascular: Negative  Negative for chest pain and palpitations  Gastrointestinal: Negative  Negative for abdominal pain, constipation, diarrhea and vomiting  Endocrine: Positive for polyuria (1-2 times per night nocturia)  Negative for cold intolerance, heat intolerance, polydipsia and polyphagia  Genitourinary: Negative  Musculoskeletal: Negative  Skin: Negative  Allergic/Immunologic: Negative  Neurological: Negative  Negative for dizziness, syncope, light-headedness and headaches  Hematological: Negative  Psychiatric/Behavioral: Negative  All other systems reviewed and are negative  Historical Information   No past medical history on file  No past surgical history on file    Social History   Social History     Substance and Sexual Activity   Alcohol Use Not on file     Social History     Substance and Sexual Activity   Drug Use Not on file     Social History     Tobacco Use   Smoking Status Current Every Day Smoker    Packs/day: 0 50    Years: 30 00    Pack years: 15 00    Types: Cigarettes   Smokeless Tobacco Never Used     Family History:   Family History   Problem Relation Age of Onset    Schizoaffective Disorder  Mother     Diabetes unspecified Father     Hyperlipidemia Father     Hypertension Father        Meds/Allergies   Current Outpatient Medications   Medication Sig Dispense Refill    aspirin (ECOTRIN LOW STRENGTH) 81 mg EC tablet Take 81 mg by mouth daily      atorvastatin (LIPITOR) 40 mg tablet Take 1 tablet (40 mg total) by mouth daily 90 tablet 1    buPROPion (WELLBUTRIN XL) 300 mg 24 hr tablet Take 300 mg by mouth daily  0    BYDUREON 2 MG PEN inject 2 MG UNDER THE SKIN ONCE A WEEK 4 each 11    Canagliflozin 300 MG TABS Take 1 tablet (300 mg total) by mouth daily 90 tablet 1    Exenatide ER (BYDUREON VAUGHN) 2 MG/0 85ML AUIJ Inject 2 mg under the skin once a week 12 pen 1    levothyroxine 150 mcg tablet Take 1 tablet (150 mcg total) by mouth daily 90 tablet 1    LORazepam (ATIVAN) 0 5 mg tablet   0    losartan (COZAAR) 50 mg tablet Take 1 tablet (50 mg total) by mouth daily 90 tablet 1    Multiple Vitamin (MULTIVITAMIN) capsule Take 1 capsule by mouth daily      pioglitazone (ACTOS) 15 mg tablet Take 1 tablet (15 mg total) by mouth daily 90 tablet 1    zolpidem (AMBIEN) 10 mg tablet   0     No current facility-administered medications for this visit  No Known Allergies    Objective   Vitals: Blood pressure 134/80, pulse (!) 108, height 5' 2" (1 575 m), weight 84 8 kg (187 lb)  Physical Exam   Constitutional: She is oriented to person, place, and time  She appears well-developed and well-nourished  HENT:   Head: Normocephalic and atraumatic  Mouth/Throat: Oropharynx is clear and moist    Eyes: Pupils are equal, round, and reactive to light  Conjunctivae and EOM are normal    Neck: Normal range of motion  Neck supple  Cardiovascular: Normal rate, regular rhythm, normal heart sounds and intact distal pulses  Pulses are no weak pulses  Pulses:       Dorsalis pedis pulses are 1+ on the right side, and 1+ on the left side  Posterior tibial pulses are 1+ on the right side, and 1+ on the left side  Pulmonary/Chest: Effort normal and breath sounds normal    Abdominal: Soft  Bowel sounds are normal    Musculoskeletal: Normal range of motion  Feet:   Right Foot:   Skin Integrity: Negative for ulcer, skin breakdown, erythema, warmth, callus or dry skin  Left Foot:   Skin Integrity: Negative for ulcer, skin breakdown, erythema, warmth, callus or dry skin  Neurological: She is alert and oriented to person, place, and time  Skin: Skin is warm and dry     Psychiatric: She has a normal mood and affect  Her behavior is normal  Judgment and thought content normal    Vitals reviewed  Patient's shoes and socks removed  Right Foot/Ankle   Right Foot Inspection  Skin Exam: skin normal and skin intact no dry skin, no warmth, no callus, no erythema, no maceration, no abnormal color, no pre-ulcer, no ulcer and no callus                            Sensory       Monofilament testing: intact  Vascular  Capillary refills: < 3 seconds  The right DP pulse is 1+  The right PT pulse is 1+  Left Foot/Ankle  Left Foot Inspection  Skin Exam: skin normal and skin intactno dry skin, no warmth, no erythema, no maceration, normal color, no pre-ulcer, no ulcer and no callus                         Toe Exam: ROM and strength within normal limits                   Sensory       Monofilament: intact  Vascular  Capillary refills: < 3 seconds  The left DP pulse is 1+  The left PT pulse is 1+  Assign Risk Category:  No deformity present; No loss of protective sensation; No weak pulses       Risk: 0        Lab Results:   Lab Results   Component Value Date/Time    Hemoglobin A1C 7 8 (H) 01/29/2019 08:30 AM    BUN 16 01/29/2019 08:30 AM    Potassium 4 3 01/29/2019 08:30 AM    Chloride 100 01/29/2019 08:30 AM    CO2 24 01/29/2019 08:30 AM    Creatinine 0 76 01/29/2019 08:30 AM    AST 22 01/29/2019 08:30 AM    ALT 33 (H) 01/29/2019 08:30 AM    Albumin 4 8 01/29/2019 08:30 AM    Globulin, Total 2 5 01/29/2019 08:30 AM    HDL 45 01/29/2019 08:30 AM    Triglycerides 250 (H) 01/29/2019 08:30 AM       Portions of the record may have been created with voice recognition software  Occasional wrong word or "sound a like" substitutions may have occurred due to the inherent limitations of voice recognition software  Read the chart carefully and recognize, using context, where substitutions have occurred

## 2019-11-21 ENCOUNTER — TELEPHONE (OUTPATIENT)
Dept: ENDOCRINOLOGY | Facility: HOSPITAL | Age: 54
End: 2019-11-21

## 2019-11-22 NOTE — TELEPHONE ENCOUNTER
Reviewed Etelvina's blood sugars that were partially dated  She is checking her blood sugars twice daily at alternating times  Overall, she appears to be well controlled

## 2020-02-28 DIAGNOSIS — I10 ESSENTIAL HYPERTENSION: ICD-10-CM

## 2020-02-28 DIAGNOSIS — E11.8 TYPE 2 DIABETES MELLITUS WITH COMPLICATION, WITHOUT LONG-TERM CURRENT USE OF INSULIN (HCC): ICD-10-CM

## 2020-02-28 DIAGNOSIS — E03.9 HYPOTHYROIDISM, UNSPECIFIED TYPE: ICD-10-CM

## 2020-02-28 DIAGNOSIS — E78.5 HYPERLIPIDEMIA, UNSPECIFIED HYPERLIPIDEMIA TYPE: ICD-10-CM

## 2020-02-28 RX ORDER — PIOGLITAZONEHYDROCHLORIDE 15 MG/1
TABLET ORAL
Qty: 90 TABLET | Refills: 1 | Status: SHIPPED | OUTPATIENT
Start: 2020-02-28 | End: 2020-04-27 | Stop reason: SDUPTHER

## 2020-02-28 RX ORDER — LOSARTAN POTASSIUM 50 MG/1
TABLET ORAL
Qty: 90 TABLET | Refills: 1 | Status: SHIPPED | OUTPATIENT
Start: 2020-02-28 | End: 2020-04-27 | Stop reason: SDUPTHER

## 2020-02-28 RX ORDER — LEVOTHYROXINE SODIUM 0.15 MG/1
TABLET ORAL
Qty: 90 TABLET | Refills: 1 | Status: SHIPPED | OUTPATIENT
Start: 2020-02-28 | End: 2020-04-27 | Stop reason: SDUPTHER

## 2020-02-28 RX ORDER — ATORVASTATIN CALCIUM 40 MG/1
TABLET, FILM COATED ORAL
Qty: 90 TABLET | Refills: 1 | Status: SHIPPED | OUTPATIENT
Start: 2020-02-28 | End: 2020-04-27 | Stop reason: SDUPTHER

## 2020-04-11 LAB
ALBUMIN SERPL-MCNC: 4.5 G/DL (ref 3.8–4.9)
ALBUMIN/CREAT UR: 12 MG/G CREAT (ref 0–29)
ALBUMIN/GLOB SERPL: 1.9 {RATIO} (ref 1.2–2.2)
ALP SERPL-CCNC: 75 IU/L (ref 39–117)
ALT SERPL-CCNC: 28 IU/L (ref 0–32)
AST SERPL-CCNC: 20 IU/L (ref 0–40)
BILIRUB SERPL-MCNC: 0.6 MG/DL (ref 0–1.2)
BUN SERPL-MCNC: 22 MG/DL (ref 6–24)
BUN/CREAT SERPL: 26 (ref 9–23)
CALCIUM SERPL-MCNC: 9.8 MG/DL (ref 8.7–10.2)
CHLORIDE SERPL-SCNC: 98 MMOL/L (ref 96–106)
CO2 SERPL-SCNC: 25 MMOL/L (ref 20–29)
CREAT SERPL-MCNC: 0.85 MG/DL (ref 0.57–1)
CREAT UR-MCNC: 135.2 MG/DL
GLOBULIN SER-MCNC: 2.4 G/DL (ref 1.5–4.5)
GLUCOSE SERPL-MCNC: 156 MG/DL (ref 65–99)
HBA1C MFR BLD: 8.6 % (ref 4.8–5.6)
MICROALBUMIN UR-MCNC: 16.8 UG/ML
POTASSIUM SERPL-SCNC: 4.6 MMOL/L (ref 3.5–5.2)
PROT SERPL-MCNC: 6.9 G/DL (ref 6–8.5)
SL AMB EGFR AFRICAN AMERICAN: 89 ML/MIN/1.73
SL AMB EGFR NON AFRICAN AMERICAN: 77 ML/MIN/1.73
SODIUM SERPL-SCNC: 140 MMOL/L (ref 134–144)
T4 FREE SERPL-MCNC: 1.51 NG/DL (ref 0.82–1.77)
TSH SERPL DL<=0.005 MIU/L-ACNC: 1.26 UIU/ML (ref 0.45–4.5)

## 2020-04-15 ENCOUNTER — TELEMEDICINE (OUTPATIENT)
Dept: ENDOCRINOLOGY | Facility: HOSPITAL | Age: 55
End: 2020-04-15
Payer: COMMERCIAL

## 2020-04-15 DIAGNOSIS — E03.9 HYPOTHYROIDISM, UNSPECIFIED TYPE: ICD-10-CM

## 2020-04-15 DIAGNOSIS — E78.5 HYPERLIPIDEMIA, UNSPECIFIED HYPERLIPIDEMIA TYPE: ICD-10-CM

## 2020-04-15 DIAGNOSIS — I10 ESSENTIAL HYPERTENSION: ICD-10-CM

## 2020-04-15 DIAGNOSIS — E11.8 TYPE 2 DIABETES MELLITUS WITH COMPLICATION, WITHOUT LONG-TERM CURRENT USE OF INSULIN (HCC): Primary | ICD-10-CM

## 2020-04-15 PROCEDURE — 99214 OFFICE O/P EST MOD 30 MIN: CPT | Performed by: INTERNAL MEDICINE

## 2020-04-17 DIAGNOSIS — E11.8 TYPE 2 DIABETES MELLITUS WITH COMPLICATION, WITHOUT LONG-TERM CURRENT USE OF INSULIN (HCC): ICD-10-CM

## 2020-04-17 RX ORDER — CANAGLIFLOZIN 300 MG/1
TABLET, FILM COATED ORAL
Qty: 90 TABLET | Refills: 1 | Status: SHIPPED | OUTPATIENT
Start: 2020-04-17 | End: 2020-04-27 | Stop reason: SDUPTHER

## 2020-04-23 ENCOUNTER — TELEMEDICINE (OUTPATIENT)
Dept: DIABETES SERVICES | Facility: HOSPITAL | Age: 55
End: 2020-04-23
Payer: COMMERCIAL

## 2020-04-23 VITALS — BODY MASS INDEX: 34.41 KG/M2 | WEIGHT: 187 LBS | HEIGHT: 62 IN

## 2020-04-23 DIAGNOSIS — E11.8 TYPE 2 DIABETES MELLITUS WITH COMPLICATION, WITHOUT LONG-TERM CURRENT USE OF INSULIN (HCC): ICD-10-CM

## 2020-04-23 PROCEDURE — 99215 OFFICE O/P EST HI 40 MIN: CPT | Performed by: DIETITIAN, REGISTERED

## 2020-04-23 PROCEDURE — 97802 MEDICAL NUTRITION INDIV IN: CPT | Performed by: DIETITIAN, REGISTERED

## 2020-04-27 DIAGNOSIS — I10 ESSENTIAL HYPERTENSION: ICD-10-CM

## 2020-04-27 DIAGNOSIS — E11.8 TYPE 2 DIABETES MELLITUS WITH COMPLICATION, WITHOUT LONG-TERM CURRENT USE OF INSULIN (HCC): ICD-10-CM

## 2020-04-27 DIAGNOSIS — E78.5 HYPERLIPIDEMIA, UNSPECIFIED HYPERLIPIDEMIA TYPE: ICD-10-CM

## 2020-04-27 DIAGNOSIS — E03.9 HYPOTHYROIDISM, UNSPECIFIED TYPE: ICD-10-CM

## 2020-04-28 RX ORDER — PIOGLITAZONEHYDROCHLORIDE 15 MG/1
15 TABLET ORAL DAILY
Qty: 90 TABLET | Refills: 3 | Status: SHIPPED | OUTPATIENT
Start: 2020-04-28 | End: 2020-10-23 | Stop reason: SDUPTHER

## 2020-04-28 RX ORDER — ATORVASTATIN CALCIUM 40 MG/1
40 TABLET, FILM COATED ORAL DAILY
Qty: 90 TABLET | Refills: 3 | Status: SHIPPED | OUTPATIENT
Start: 2020-04-28 | End: 2021-06-15 | Stop reason: SDUPTHER

## 2020-04-28 RX ORDER — LEVOTHYROXINE SODIUM 0.15 MG/1
150 TABLET ORAL DAILY
Qty: 90 TABLET | Refills: 3 | Status: SHIPPED | OUTPATIENT
Start: 2020-04-28 | End: 2021-06-15 | Stop reason: SDUPTHER

## 2020-04-28 RX ORDER — LOSARTAN POTASSIUM 50 MG/1
50 TABLET ORAL DAILY
Qty: 90 TABLET | Refills: 3 | Status: SHIPPED | OUTPATIENT
Start: 2020-04-28 | End: 2021-06-15 | Stop reason: SDUPTHER

## 2020-04-29 ENCOUNTER — TELEMEDICINE (OUTPATIENT)
Dept: DIABETES SERVICES | Facility: HOSPITAL | Age: 55
End: 2020-04-29
Payer: COMMERCIAL

## 2020-04-29 ENCOUNTER — TELEPHONE (OUTPATIENT)
Dept: DIABETES SERVICES | Facility: HOSPITAL | Age: 55
End: 2020-04-29

## 2020-04-29 VITALS — BODY MASS INDEX: 33.11 KG/M2 | WEIGHT: 181 LBS

## 2020-04-29 DIAGNOSIS — E11.8 TYPE 2 DIABETES MELLITUS WITH COMPLICATION, WITHOUT LONG-TERM CURRENT USE OF INSULIN (HCC): Primary | ICD-10-CM

## 2020-04-29 PROCEDURE — 97803 MED NUTRITION INDIV SUBSEQ: CPT | Performed by: DIETITIAN, REGISTERED

## 2020-04-29 RX ORDER — LANCETS 33 GAUGE
EACH MISCELLANEOUS
Qty: 200 EACH | Refills: 3 | Status: SHIPPED | OUTPATIENT
Start: 2020-04-29 | End: 2020-10-28 | Stop reason: CLARIF

## 2020-04-29 RX ORDER — BLOOD SUGAR DIAGNOSTIC
STRIP MISCELLANEOUS
Qty: 200 EACH | Refills: 3 | Status: SHIPPED | OUTPATIENT
Start: 2020-04-29 | End: 2020-10-23 | Stop reason: SDUPTHER

## 2020-04-29 RX ORDER — BLOOD-GLUCOSE METER
EACH MISCELLANEOUS ONCE
Qty: 1 KIT | Refills: 0 | Status: SHIPPED | OUTPATIENT
Start: 2020-04-29 | End: 2021-08-06

## 2020-05-01 ENCOUNTER — TELEPHONE (OUTPATIENT)
Dept: ENDOCRINOLOGY | Facility: HOSPITAL | Age: 55
End: 2020-05-01

## 2020-05-01 DIAGNOSIS — E11.8 TYPE 2 DIABETES MELLITUS WITH COMPLICATION, WITHOUT LONG-TERM CURRENT USE OF INSULIN (HCC): Primary | ICD-10-CM

## 2020-05-05 ENCOUNTER — DOCUMENTATION (OUTPATIENT)
Dept: ENDOCRINOLOGY | Facility: HOSPITAL | Age: 55
End: 2020-05-05

## 2020-07-02 DIAGNOSIS — E11.8 TYPE 2 DIABETES MELLITUS WITH COMPLICATION, WITHOUT LONG-TERM CURRENT USE OF INSULIN (HCC): ICD-10-CM

## 2020-07-02 NOTE — TELEPHONE ENCOUNTER
The Ozempic that was sent for pt was not sent as a 90 day supply  She needs 12 pens sent instead of 6 please

## 2020-07-06 ENCOUNTER — DOCUMENTATION (OUTPATIENT)
Dept: ENDOCRINOLOGY | Facility: HOSPITAL | Age: 55
End: 2020-07-06

## 2020-07-13 ENCOUNTER — TELEPHONE (OUTPATIENT)
Dept: ENDOCRINOLOGY | Facility: HOSPITAL | Age: 55
End: 2020-07-13

## 2020-07-13 NOTE — TELEPHONE ENCOUNTER
Received call from patient  She is requesting a sample of Ozempic, until her script comes in  Also patient will need a form completed by you, she will drop it off when she picks up the sample

## 2020-07-14 DIAGNOSIS — E11.8 TYPE 2 DIABETES MELLITUS WITH COMPLICATION, WITHOUT LONG-TERM CURRENT USE OF INSULIN (HCC): ICD-10-CM

## 2020-07-14 NOTE — TELEPHONE ENCOUNTER
It looks like we want her on 1 mg weekly so if we do not have any of the 1 mg pens, she would need to take 0 5 mg twice to get that full dose  May be a good idea to have her meet with [facility] just to confirm she is taking the right amount

## 2020-07-14 NOTE — TELEPHONE ENCOUNTER
Spoke with patient  She will  sample today  While speaking with the patient, she seems to be confused on the dosage  She has been using the full dose of the pen once weekly  I am not sure how to explain to her how to use the pen  For example the sample only has doses of 0 25mg or 0 5mg- therefore she needs to inject twice at 0 5mg correct? Should we have her meet with [facility] or have Milagros call her?

## 2020-07-18 LAB — HBA1C MFR BLD HPLC: 7.6 %

## 2020-07-21 ENCOUNTER — OFFICE VISIT (OUTPATIENT)
Dept: ENDOCRINOLOGY | Facility: HOSPITAL | Age: 55
End: 2020-07-21
Payer: COMMERCIAL

## 2020-07-21 ENCOUNTER — OFFICE VISIT (OUTPATIENT)
Dept: DIABETES SERVICES | Facility: HOSPITAL | Age: 55
End: 2020-07-21
Payer: COMMERCIAL

## 2020-07-21 VITALS — BODY MASS INDEX: 33.27 KG/M2 | HEIGHT: 62 IN | WEIGHT: 180.8 LBS

## 2020-07-21 VITALS
DIASTOLIC BLOOD PRESSURE: 70 MMHG | HEIGHT: 62 IN | TEMPERATURE: 98.5 F | SYSTOLIC BLOOD PRESSURE: 110 MMHG | WEIGHT: 180.8 LBS | BODY MASS INDEX: 33.27 KG/M2 | HEART RATE: 81 BPM

## 2020-07-21 DIAGNOSIS — I10 ESSENTIAL HYPERTENSION: ICD-10-CM

## 2020-07-21 DIAGNOSIS — E78.5 HYPERLIPIDEMIA, UNSPECIFIED HYPERLIPIDEMIA TYPE: ICD-10-CM

## 2020-07-21 DIAGNOSIS — E11.8 TYPE 2 DIABETES MELLITUS WITH COMPLICATION, WITHOUT LONG-TERM CURRENT USE OF INSULIN (HCC): Primary | ICD-10-CM

## 2020-07-21 DIAGNOSIS — E03.9 HYPOTHYROIDISM, UNSPECIFIED TYPE: ICD-10-CM

## 2020-07-21 PROCEDURE — 99214 OFFICE O/P EST MOD 30 MIN: CPT | Performed by: NURSE PRACTITIONER

## 2020-07-21 PROCEDURE — 97803 MED NUTRITION INDIV SUBSEQ: CPT | Performed by: DIETITIAN, REGISTERED

## 2020-07-21 NOTE — PROGRESS NOTES
Silvia Hurst 53 y o  female MRN: [id number]    Encounter: [id number]      Assessment/Plan     Assessment: This is a 53y o -year-old female with type 2 diabetes, hypothyroidism, hypertension and hyperlipidemia  Plan:  1  Type 2 diabetes:  Her most recent hemoglobin A1c is 7 6  There are no blood sugar records available for review  For now, I have asked her to continue [address] and [address]  She will confirm her Actos dose  Discussed the importance of a proper diabetic diet and exercise  She will continue to follow up with medical nutrition therapy for help with her diet as needed   I have asked her to check her blood sugars at least twice daily at alternating times and send a record to the office in 2 weeks for review   Check hemoglobin A1c prior to next visit      2  Hypothyroidism:  She complains of fatigue at times after work  Wanda Gravel TSH and free T4 prior to next visit        3  Hypertension:  She is normotensive in the office today  Continue losartan  Check comprehensive metabolic panel prior to next visit      4  Hyperlipidemia:  Stable  Continue atorvastatin       CC:  Type 2 Diabetes follow-up    History of Present Illness     HPI:  53 y o  female with type 2 diabetes for 10 years   She is on oral agents at home and takes Actos 15 mg daily (increase to 30 mg at last visit), Bydureon 2 mg weekly, Invokana 300 mg daily  Her most recent hemoglobin A1c from July 18, 2020 is 7 6   She denies any polyuria, polydipsia, nocturia and blurry vision   She denies neuropathy, nephropathy and retinopathy   She states that she did not tolerate metformin or Januvia in the past        Hypoglycemic episodes: None       Most recent diabetic eye exam was obtained on October 2, 2019 with mild stable retinopathy in the right eye   She has no complaints about her feet and does not follow Podiatry for regular diabetic foot care    Diabetic foot exam was performed at office visit on October 3, 2019       Blood Sugar/Glucometer/Pump/CGM review:  She presents with no blood sugar records or meter for download in the office today      For hyperlipidemia takes atorvastatin 40 mg daily        For hypothyroidism she takes levothyroxine 150 mcg daily  She complains of some fatigue  Her most recent TSH from July 18, 2020 is 2 020 with a free T4 of 1 44      For hypertension she takes losartan 50 mg daily  Review of Systems   Constitutional: Positive for fatigue  Negative for chills and fever  HENT: Negative  Negative for trouble swallowing and voice change  Eyes: Negative  Respiratory: Negative  Negative for chest tightness and shortness of breath  Cardiovascular: Negative  Negative for chest pain  Gastrointestinal: Negative  Negative for abdominal pain, constipation, diarrhea and vomiting  Endocrine: Positive for polyuria (Once per night nocturia)  Negative for cold intolerance, heat intolerance and polydipsia  Genitourinary: Negative  Musculoskeletal: Negative  Skin: Negative  Allergic/Immunologic: Negative  Neurological: Negative  Negative for dizziness, syncope, light-headedness and headaches  Hematological: Negative  Psychiatric/Behavioral: Negative  All other systems reviewed and are negative  Historical Information   No past medical history on file  No past surgical history on file    Social History   Social History     Substance and Sexual Activity   Alcohol Use Not Currently     Social History     Substance and Sexual Activity   Drug Use Never     Social History     Tobacco Use   Smoking Status Current Every Day Smoker    Packs/day: 0 50    Years: 30 00    Pack years: 15 00    Types: Cigarettes   Smokeless Tobacco Never Used   Tobacco Comment    working on quitting, goes a few days without it and then has some emotional smoking      Family History:   Family History   Problem Relation Age of Onset    Schizoaffective Disorder  Mother     Diabetes unspecified Father     Hyperlipidemia Father     Hypertension Father        Meds/Allergies   Current Outpatient Medications   Medication Sig Dispense Refill    aspirin (ECOTRIN LOW STRENGTH) 81 mg EC tablet Take 81 mg by mouth daily      atorvastatin (LIPITOR) 40 mg tablet Take 1 tablet (40 mg total) by mouth daily 90 tablet 3    buPROPion (WELLBUTRIN XL) 300 mg 24 hr tablet Take 300 mg by mouth daily  0    Empagliflozin (Jardiance) 25 MG TABS Take 1 tablet (25 mg total) by mouth every morning 90 tablet 1    levothyroxine 150 mcg tablet Take 1 tablet (150 mcg total) by mouth daily 90 tablet 3    LORazepam (ATIVAN) 0 5 mg tablet   0    losartan (COZAAR) 50 mg tablet Take 1 tablet (50 mg total) by mouth daily 90 tablet 3    Multiple Vitamin (MULTIVITAMIN) capsule Take 1 capsule by mouth daily      OneTouch Delica Lancets 81L MISC Test blood sugar twice daily 200 each 3    ONETOUCH VERIO test strip Test blood sugar twice daily 200 each 3    pioglitazone (ACTOS) 15 mg tablet Take 1 tablet (15 mg total) by mouth daily 90 tablet 3    Semaglutide, 1 MG/DOSE, (Ozempic, 1 MG/DOSE,) 2 MG/1 5ML SOPN Inject 1 mg under the skin once a week 6 pen 1    Blood Glucose Monitoring Suppl (ONETOUCH VERIO) w/Device KIT by Does not apply route once for 1 dose Use glucometer to check blood sugar daily  1 kit 0     No current facility-administered medications for this visit  No Known Allergies    Objective   Vitals: Blood pressure 110/70, pulse 81, temperature 98 5 °F (36 9 °C), height 5' 2" (1 575 m), weight 82 kg (180 lb 12 8 oz)  Physical Exam   Constitutional: She is oriented to person, place, and time  She appears well-developed and well-nourished  Overweight   HENT:   Head: Normocephalic and atraumatic  Mouth/Throat: Oropharynx is clear and moist    Eyes: Pupils are equal, round, and reactive to light  Conjunctivae and EOM are normal    Neck: Normal range of motion  Neck supple     Cardiovascular: Normal rate, regular rhythm, normal heart sounds and intact distal pulses  Pulmonary/Chest: Effort normal and breath sounds normal    Abdominal: Soft  Bowel sounds are normal    Musculoskeletal: Normal range of motion  Neurological: She is alert and oriented to person, place, and time  Skin: Skin is warm and dry  Dry skin   Psychiatric: She has a normal mood and affect  Her behavior is normal  Judgment and thought content normal    Vitals reviewed  Lab Results:   Lab Results   Component Value Date/Time    Hemoglobin A1C 7 6 07/18/2020    Hemoglobin A1C 8 6 (H) 04/10/2020 08:20 AM    Hemoglobin A1C 8 0 10/01/2019    BUN 22 04/10/2020 08:20 AM    Potassium 4 6 04/10/2020 08:20 AM    Chloride 98 04/10/2020 08:20 AM    CO2 25 04/10/2020 08:20 AM    Creatinine 0 85 04/10/2020 08:20 AM    AST 20 04/10/2020 08:20 AM    ALT 28 04/10/2020 08:20 AM    Albumin 4 5 04/10/2020 08:20 AM    Globulin, Total 2 4 04/10/2020 08:20 AM       Portions of the record may have been created with voice recognition software  Occasional wrong word or "sound a like" substitutions may have occurred due to the inherent limitations of voice recognition software  Read the chart carefully and recognize, using context, where substitutions have occurred

## 2020-07-21 NOTE — PATIENT INSTRUCTIONS
Be mindful of diet      Stay active and stay hydrated  Confirm dose of Actos      Continue Bydureon and Invokana at current dose      Check your blood sugars regularly and send a record to the office in 2 weeks for review      Continue levothyroxine at current dose      Continue losartan and atorvastatin      Complete lab work as prescribed  Stephanie Anderson will contact you with the results      Continue to follow-up with medical nutrition therapy for help with her diet, as discussed

## 2020-07-21 NOTE — PROGRESS NOTES
Medical Nutrition Therapy     Assessment    Visit Type: Follow-up visit  Chief complaint:  Type 2 diabetes     HPI:  Met with Etelvina for [place] T follow-up, since our last visit she has started with new health insurance and has had to change multiple medications due to formulary  She changed from Bydureon to those them back, and she was accidentally taking a significantly larger dose than she was supposed to  This happened for a few doses before it was discovered, no side effects or issues noted  I did confirm with her how to use the is epic pen today, she did not even notice there were multiple doses on the pen as she is used to [employer] which is single use  She feels confident her ability to use it now  [place] T follow-up completed, since our last visit in April she continues to reduce meal consumption to be very little at this point, was doing multiple 16 oz glasses a day  Continues to reduce sweets, district quest strategies to improve this especially with kids in the house  Set primary goal to be working on getting exercise  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, discussed getting a pedal for under her desk to bike while she sits, walking around the house while on telephone conferences, any way that she can incorporate more activity  She has lost 7 lb and has brought her A1c down a whole point  Has a linked visit with Lizette Guzman the nurse practitioner today  Good understanding of material discussed, she will call with questions or for more Education, no follow-up scheduled at this time  Ht Readings from Last 1 Encounters:   07/21/20 5' 2" (1 575 m)     Wt Readings from Last 3 Encounters:   07/21/20 82 kg (180 lb 12 8 oz)   04/29/20 82 1 kg (181 lb)   04/23/20 84 8 kg (187 lb)        Body mass index is 33 07 kg/m²       Lab Results   Component Value Date    HGBA1C 7 6 07/18/2020    HGBA1C 8 6 (H) 04/10/2020    HGBA1C 8 0 10/01/2019       No results found for: CHOL  Lab Results   Component Value Date    HDL 45 01/29/2019     Lab Results   Component Value Date    LDLCALC 93 01/29/2019     Lab Results   Component Value Date    TRIG 250 (H) 01/29/2019     No results found for: CHOLHDL    Weight Change: Yes has lost 7lbs since starting lifestyle changes    Medical Diagnosis/ICD 10 Code:      Barriers to Learning: no barriers    Do you follow any special diet presently?: No  Who shops: patient  Who cooks: patient    Nutrition Diagnosis:  Food and nutrition related knowledge deficit  related to Lack of prior exposure to accurate nutrition related information as evidenced by [employer] inaccurate or incomplete information    Intervention: plate method, behavior modification strategies, carbohydrate counting, individualized meal plan, monitoring portion control and exercise guidelines     Treatment Goals: Patient understands education and recommendations    Education Material Given  [facility] was provided the Portion Booklet and Planning Healthy Meals     Monitoring and evaluation:    Term code indicator   1 6 3 Carbohydrate Intake Criteria: 30-45g CHO per meal, 15g CHO snacks    Patients Response to Instruction:  Benny Vazquez  Expected Compliancegood    Thank you for coming to the [facility] for education today  Please feel free to call with any questions or concerns      Dimitry Hernandez, [address]  [address] [facility] [address]  [address] [facility] [address]

## 2020-10-20 LAB
ALBUMIN SERPL-MCNC: 4.3 G/DL (ref 3.8–4.9)
ALBUMIN/GLOB SERPL: 1.9 {RATIO} (ref 1.2–2.2)
ALP SERPL-CCNC: 80 IU/L (ref 39–117)
ALT SERPL-CCNC: 24 IU/L (ref 0–32)
AST SERPL-CCNC: 14 IU/L (ref 0–40)
BILIRUB SERPL-MCNC: 0.2 MG/DL (ref 0–1.2)
BUN SERPL-MCNC: 23 MG/DL (ref 6–24)
BUN/CREAT SERPL: 33 (ref 9–23)
CALCIUM SERPL-MCNC: 9.5 MG/DL (ref 8.7–10.2)
CHLORIDE SERPL-SCNC: 103 MMOL/L (ref 96–106)
CO2 SERPL-SCNC: 23 MMOL/L (ref 20–29)
CREAT SERPL-MCNC: 0.69 MG/DL (ref 0.57–1)
EST. AVERAGE GLUCOSE BLD GHB EST-MCNC: 174 MG/DL
GLOBULIN SER-MCNC: 2.3 G/DL (ref 1.5–4.5)
GLUCOSE SERPL-MCNC: 158 MG/DL (ref 65–99)
HBA1C MFR BLD: 7.7 % (ref 4.8–5.6)
POTASSIUM SERPL-SCNC: 4.1 MMOL/L (ref 3.5–5.2)
PROT SERPL-MCNC: 6.6 G/DL (ref 6–8.5)
SL AMB EGFR AFRICAN AMERICAN: 113 ML/MIN/1.73
SL AMB EGFR NON AFRICAN AMERICAN: 98 ML/MIN/1.73
SODIUM SERPL-SCNC: 141 MMOL/L (ref 134–144)
T4 FREE SERPL-MCNC: 1.2 NG/DL (ref 0.82–1.77)
TSH SERPL DL<=0.005 MIU/L-ACNC: 1.07 UIU/ML (ref 0.45–4.5)

## 2020-10-23 ENCOUNTER — OFFICE VISIT (OUTPATIENT)
Dept: ENDOCRINOLOGY | Facility: HOSPITAL | Age: 55
End: 2020-10-23
Payer: COMMERCIAL

## 2020-10-23 VITALS
HEIGHT: 62 IN | WEIGHT: 183 LBS | SYSTOLIC BLOOD PRESSURE: 118 MMHG | HEART RATE: 93 BPM | TEMPERATURE: 97.6 F | BODY MASS INDEX: 33.68 KG/M2 | DIASTOLIC BLOOD PRESSURE: 80 MMHG

## 2020-10-23 DIAGNOSIS — I10 ESSENTIAL HYPERTENSION: ICD-10-CM

## 2020-10-23 DIAGNOSIS — E11.8 TYPE 2 DIABETES MELLITUS WITH COMPLICATION, WITHOUT LONG-TERM CURRENT USE OF INSULIN (HCC): Primary | ICD-10-CM

## 2020-10-23 DIAGNOSIS — E78.5 HYPERLIPIDEMIA, UNSPECIFIED HYPERLIPIDEMIA TYPE: ICD-10-CM

## 2020-10-23 DIAGNOSIS — E03.9 HYPOTHYROIDISM, UNSPECIFIED TYPE: ICD-10-CM

## 2020-10-23 PROCEDURE — 99214 OFFICE O/P EST MOD 30 MIN: CPT | Performed by: NURSE PRACTITIONER

## 2020-10-23 RX ORDER — PIOGLITAZONEHYDROCHLORIDE 30 MG/1
30 TABLET ORAL DAILY
Qty: 90 TABLET | Refills: 3 | Status: SHIPPED | OUTPATIENT
Start: 2020-10-23 | End: 2021-06-15 | Stop reason: SDUPTHER

## 2020-10-23 RX ORDER — BLOOD SUGAR DIAGNOSTIC
STRIP MISCELLANEOUS
Qty: 200 EACH | Refills: 3 | Status: SHIPPED | OUTPATIENT
Start: 2020-10-23 | End: 2020-10-28 | Stop reason: CLARIF

## 2020-10-26 ENCOUNTER — TELEPHONE (OUTPATIENT)
Dept: ADMINISTRATIVE | Facility: OTHER | Age: 55
End: 2020-10-26

## 2020-10-27 ENCOUNTER — TELEPHONE (OUTPATIENT)
Dept: ENDOCRINOLOGY | Facility: HOSPITAL | Age: 55
End: 2020-10-27

## 2020-10-28 DIAGNOSIS — E11.8 TYPE 2 DIABETES MELLITUS WITH COMPLICATION, WITHOUT LONG-TERM CURRENT USE OF INSULIN (HCC): Primary | ICD-10-CM

## 2020-10-28 RX ORDER — BLOOD SUGAR DIAGNOSTIC
STRIP MISCELLANEOUS
Qty: 200 EACH | Refills: 3 | Status: SHIPPED | OUTPATIENT
Start: 2020-10-28

## 2020-10-28 RX ORDER — BLOOD-GLUCOSE METER
EACH MISCELLANEOUS ONCE
Qty: 1 KIT | Refills: 0 | Status: SHIPPED | OUTPATIENT
Start: 2020-10-28 | End: 2021-08-06

## 2020-10-28 RX ORDER — LANCETS
EACH MISCELLANEOUS
Qty: 200 EACH | Refills: 3 | Status: SHIPPED | OUTPATIENT
Start: 2020-10-28

## 2020-11-04 DIAGNOSIS — E11.8 TYPE 2 DIABETES MELLITUS WITH COMPLICATION, WITHOUT LONG-TERM CURRENT USE OF INSULIN (HCC): Primary | ICD-10-CM

## 2020-11-04 RX ORDER — SEMAGLUTIDE 1.34 MG/ML
1 INJECTION, SOLUTION SUBCUTANEOUS WEEKLY
Qty: 6 PEN | Refills: 1 | Status: SHIPPED | OUTPATIENT
Start: 2020-11-04 | End: 2021-06-01

## 2020-12-28 DIAGNOSIS — E11.8 TYPE 2 DIABETES MELLITUS WITH COMPLICATION, WITHOUT LONG-TERM CURRENT USE OF INSULIN (HCC): ICD-10-CM

## 2020-12-28 RX ORDER — EMPAGLIFLOZIN 25 MG/1
TABLET, FILM COATED ORAL
Qty: 90 TABLET | Refills: 1 | Status: SHIPPED | OUTPATIENT
Start: 2020-12-28 | End: 2021-06-15 | Stop reason: SDUPTHER

## 2021-02-24 LAB — HBA1C MFR BLD HPLC: 8.3 %

## 2021-03-05 ENCOUNTER — TELEMEDICINE (OUTPATIENT)
Dept: ENDOCRINOLOGY | Facility: HOSPITAL | Age: 56
End: 2021-03-05
Payer: COMMERCIAL

## 2021-03-05 ENCOUNTER — TELEPHONE (OUTPATIENT)
Dept: ENDOCRINOLOGY | Facility: HOSPITAL | Age: 56
End: 2021-03-05

## 2021-03-05 DIAGNOSIS — E03.9 HYPOTHYROIDISM, UNSPECIFIED TYPE: ICD-10-CM

## 2021-03-05 DIAGNOSIS — E78.5 HYPERLIPIDEMIA, UNSPECIFIED HYPERLIPIDEMIA TYPE: ICD-10-CM

## 2021-03-05 DIAGNOSIS — I10 ESSENTIAL HYPERTENSION: ICD-10-CM

## 2021-03-05 DIAGNOSIS — E11.8 TYPE 2 DIABETES MELLITUS WITH COMPLICATION, WITHOUT LONG-TERM CURRENT USE OF INSULIN (HCC): Primary | ICD-10-CM

## 2021-03-05 PROCEDURE — 99214 OFFICE O/P EST MOD 30 MIN: CPT | Performed by: NURSE PRACTITIONER

## 2021-03-05 RX ORDER — ZOLPIDEM TARTRATE 10 MG/1
TABLET ORAL
COMMUNITY
Start: 2021-02-14

## 2021-03-05 RX ORDER — GLIMEPIRIDE 1 MG/1
TABLET ORAL
Qty: 180 TABLET | Refills: 3 | Status: SHIPPED | OUTPATIENT
Start: 2021-03-05 | End: 2021-06-15 | Stop reason: SDUPTHER

## 2021-03-05 RX ORDER — FLASH GLUCOSE SCANNING READER
1 EACH MISCELLANEOUS
Qty: 1 DEVICE | Refills: 0 | Status: SHIPPED | OUTPATIENT
Start: 2021-03-05

## 2021-03-05 RX ORDER — FLASH GLUCOSE SENSOR
1 KIT MISCELLANEOUS
Qty: 2 EACH | Refills: 6 | Status: SHIPPED | OUTPATIENT
Start: 2021-03-05

## 2021-03-05 NOTE — PATIENT INSTRUCTIONS
Be mindful of diet      Stay active and stay hydrated      Continue Ozempic,  Actos and Jardiance at current dose  As discussed, start glimepiride 1 mg with breakfast and dinner  Contact the office with any issues, side effects or episodes of consistent hypoglycemia      Check your blood sugars regularly and send a record to the office in 2 weeks for review      Continue levothyroxine at current dose      Continue losartan and atorvastatin      Complete lab work as prescribed

## 2021-03-05 NOTE — PROGRESS NOTES
Virtual Regular Visit      Problem List Items Addressed This Visit        Endocrine    Hypothyroidism    Type 2 diabetes mellitus with complication, without long-term current use of insulin ([facility] [address] ) - Primary       Cardiovascular and Mediastinum    Essential hypertension       Other    Hyperlipidemia        Reason for visit is type 2 diabetes, hypothyroidism, hypertension and hyperlipidemia  Encounter provider HUGO Sun    Provider located at [address] [place] [address] [address] [place] [address]      Recent Visits  No visits were found meeting these conditions  Showing recent visits within past 7 days and meeting all other requirements     Future Appointments  No visits were found meeting these conditions  Showing future appointments within next 150 days and meeting all other requirements        The patient was identified by name and date of birth  Vanessa Vera was informed that this is a telemedicine visit and that the visit is being conducted through [employer] and patient was informed that this is not a secure, HIPAA-compliant platform  She agrees to proceed     My office door was closed  No one else was in the room  She acknowledged consent and understanding of privacy and security of the video platform  The patient has agreed to participate and understands they can discontinue the visit at any time  Patient is aware this is a billable service       Subjective  Vanessa Vera is a 63 y o  female with type 2 diabetes for 10 years   She is on oral agents at home and takes Actos 30 mg daily, Ozempic 1 mg weekly and Jardiance 25 mg daily   Her most recent hemoglobin A1c from February 24, 2021 is 8 3   She denies any polyuria, polydipsia, nocturia and blurry vision   She denies neuropathy, nephropathy and retinopathy   She states that she did not tolerate metformin or Januvia in the past        Hypoglycemic episodes: None       Most recent diabetic eye exam was obtained on October 12, 2020 with mild stable retinopathy in the right eye  She has no complaints about her feet and does not follow Podiatry for regular diabetic foot care   Diabetic foot exam was performed at office visit on October 23, 2020       Blood Sugar/Glucometer/Pump/CGM review:  She presents with no blood sugar records or meter for download in the office today      For hyperlipidemia takes atorvastatin 40 mg daily        For hypothyroidism she takes levothyroxine 150 mcg daily  She complains of some fatigue   Her most recent TSH from February 24, 2021 is 1 580  with a free T4 of 1 50      For hypertension she takes losartan 50 mg daily      HPI     History reviewed  No pertinent past medical history  History reviewed  No pertinent surgical history  Current Outpatient Medications   Medication Sig Dispense Refill    Accu-Chek Softclix Lancets lancets Test blood sugar twice daily  200 each 3    aspirin (ECOTRIN LOW STRENGTH) 81 mg EC tablet Take 81 mg by mouth daily      atorvastatin (LIPITOR) 40 mg tablet Take 1 tablet (40 mg total) by mouth daily 90 tablet 3    buPROPion (WELLBUTRIN XL) 300 mg 24 hr tablet Take 300 mg by mouth daily  0    glucose blood (Accu-Chek Guide) test strip Test blood sugars twice daily   200 each 3    Jardiance 25 MG TABS TAKE 1 TABLET EVERY MORNING 90 tablet 1    levothyroxine 150 mcg tablet Take 1 tablet (150 mcg total) by mouth daily 90 tablet 3    LORazepam (ATIVAN) 0 5 mg tablet   0    losartan (COZAAR) 50 mg tablet Take 1 tablet (50 mg total) by mouth daily 90 tablet 3    Multiple Vitamin (MULTIVITAMIN) capsule Take 1 capsule by mouth daily      pioglitazone (ACTOS) 30 mg tablet Take 1 tablet (30 mg total) by mouth daily 90 tablet 3    Semaglutide, 1 MG/DOSE, (Ozempic, 1 MG/DOSE,) 2 MG/1 5ML SOPN Inject 1 mg under the skin once a week 6 pen 1    zolpidem (AMBIEN) 10 mg tablet       Blood Glucose Monitoring Suppl (Accu-Chek Guide) w/Device KIT by Does not apply route once for 1 dose Use glucometer to check blood sugars daily  1 kit 0    Blood Glucose Monitoring Suppl (Precious Olson) w/Device KIT by Does not apply route once for 1 dose Use glucometer to check blood sugar daily  1 kit 0     No current facility-administered medications for this visit  No Known Allergies    Review of Systems   Constitutional: Positive for fatigue  Negative for chills and fever  HENT: Negative  Negative for trouble swallowing and voice change  Eyes: Negative  Negative for visual disturbance  Respiratory: Negative  Negative for chest tightness and shortness of breath  Cardiovascular: Negative  Negative for chest pain  Gastrointestinal: Negative  Negative for abdominal pain, constipation, diarrhea and vomiting  Endocrine: Positive for polyuria ( 1-2 times per night nocturia)  Negative for cold intolerance, heat intolerance, polydipsia and polyphagia  Genitourinary: Negative  Musculoskeletal: Negative  Skin: Negative  Allergic/Immunologic: Negative  Neurological: Negative  Negative for dizziness, syncope, light-headedness and headaches  Hematological: Negative  Psychiatric/Behavioral: Negative  All other systems reviewed and are negative  Video Exam    There were no vitals filed for this visit  Physical Exam     Physical Exam   Constitutional: she is oriented to person, place, and time  She appears well-developed and well-nourished  No distress  HENT:   Head: Normocephalic and atraumatic  Neck: Normal range of motion  Pulmonary/Chest: Effort normal    Musculoskeletal: Normal range of motion  Neurological: she is alert and oriented to person, place, and time  Skin: she is not diaphoretic  Psychiatric: she has a normal mood and affect  her behavior is normal      Plan:  1  Type 2 diabetes:  Her most recent hemoglobin A1c is elevated to 8  3   There are no blood sugar records available for review  For now, I have asked her to continue Ozempic, Jardiance and Actos at current dose  Discussed the benefits, risks and side effects of glimepiride  She will start glimepiride 1 mg with breakfast and dinner   Discussed the importance of a proper diabetic diet and exercise  I have asked her to check her blood sugars at least twice daily at alternating times and send a record to the office in 2 weeks for review  Reviewed recognition and proper treatment of hypoglycemic episodes   Check hemoglobin A1c prior to next visit      2  Hypothyroidism:  Most recent TSH and free T4 are normal   She complains of fatigue at times after work  Continue levothyroxine at current dose   Check TSH and free T4 prior to next visit        3  Hypertension:  Continue losartan   Check comprehensive metabolic panel prior to next visit      4  Hyperlipidemia:  Stable   Continue atorvastatin      I spent 30 minutes directly with the patient during this visit      [address] [facility] acknowledges that she has consented to an online visit or consultation  She understands that the online visit is based solely on information provided by her, and that, in the absence of a face-to-face physical evaluation by the physician, the diagnosis she receives is both limited and provisional in terms of accuracy and completeness  This is not intended to replace a full medical face-to-face evaluation by the physician  Espinoza Goldberg understands and accepts these terms

## 2021-05-29 DIAGNOSIS — E11.8 TYPE 2 DIABETES MELLITUS WITH COMPLICATION, WITHOUT LONG-TERM CURRENT USE OF INSULIN (HCC): ICD-10-CM

## 2021-06-01 RX ORDER — SEMAGLUTIDE 1.34 MG/ML
INJECTION, SOLUTION SUBCUTANEOUS
Qty: 9 ML | Refills: 1 | Status: SHIPPED | OUTPATIENT
Start: 2021-06-01 | End: 2021-08-30

## 2021-06-15 DIAGNOSIS — E78.5 HYPERLIPIDEMIA, UNSPECIFIED HYPERLIPIDEMIA TYPE: ICD-10-CM

## 2021-06-15 DIAGNOSIS — E11.8 TYPE 2 DIABETES MELLITUS WITH COMPLICATION, WITHOUT LONG-TERM CURRENT USE OF INSULIN (HCC): ICD-10-CM

## 2021-06-15 DIAGNOSIS — I10 ESSENTIAL HYPERTENSION: ICD-10-CM

## 2021-06-15 DIAGNOSIS — E03.9 HYPOTHYROIDISM, UNSPECIFIED TYPE: ICD-10-CM

## 2021-06-15 RX ORDER — PIOGLITAZONEHYDROCHLORIDE 30 MG/1
30 TABLET ORAL DAILY
Qty: 90 TABLET | Refills: 3 | Status: SHIPPED | OUTPATIENT
Start: 2021-06-15 | End: 2021-12-10 | Stop reason: SDUPTHER

## 2021-06-15 RX ORDER — GLIMEPIRIDE 1 MG/1
TABLET ORAL
Qty: 180 TABLET | Refills: 3 | Status: SHIPPED | OUTPATIENT
Start: 2021-06-15

## 2021-06-15 RX ORDER — LEVOTHYROXINE SODIUM 0.15 MG/1
150 TABLET ORAL DAILY
Qty: 90 TABLET | Refills: 3 | Status: SHIPPED | OUTPATIENT
Start: 2021-06-15 | End: 2021-08-02

## 2021-06-15 RX ORDER — EMPAGLIFLOZIN 25 MG/1
25 TABLET, FILM COATED ORAL EVERY MORNING
Qty: 90 TABLET | Refills: 3 | Status: SHIPPED | OUTPATIENT
Start: 2021-06-15

## 2021-06-15 RX ORDER — ATORVASTATIN CALCIUM 40 MG/1
40 TABLET, FILM COATED ORAL DAILY
Qty: 90 TABLET | Refills: 3 | Status: SHIPPED | OUTPATIENT
Start: 2021-06-15

## 2021-06-15 RX ORDER — LOSARTAN POTASSIUM 50 MG/1
50 TABLET ORAL DAILY
Qty: 90 TABLET | Refills: 3 | Status: SHIPPED | OUTPATIENT
Start: 2021-06-15

## 2021-07-31 DIAGNOSIS — E03.9 HYPOTHYROIDISM, UNSPECIFIED TYPE: ICD-10-CM

## 2021-08-02 RX ORDER — LEVOTHYROXINE SODIUM 150 UG/1
TABLET ORAL
Qty: 90 TABLET | Refills: 3 | Status: SHIPPED | OUTPATIENT
Start: 2021-08-02

## 2021-08-05 LAB
ALBUMIN SERPL-MCNC: 4.7 G/DL (ref 3.8–4.9)
ALBUMIN/CREAT UR: 11 MG/G CREAT (ref 0–29)
ALBUMIN/GLOB SERPL: 2.1 {RATIO} (ref 1.2–2.2)
ALP SERPL-CCNC: 73 IU/L (ref 48–121)
ALT SERPL-CCNC: 23 IU/L (ref 0–32)
AST SERPL-CCNC: 21 IU/L (ref 0–40)
BILIRUB SERPL-MCNC: 0.4 MG/DL (ref 0–1.2)
BUN SERPL-MCNC: 22 MG/DL (ref 6–24)
BUN/CREAT SERPL: 23 (ref 9–23)
CALCIUM SERPL-MCNC: 10 MG/DL (ref 8.7–10.2)
CHLORIDE SERPL-SCNC: 101 MMOL/L (ref 96–106)
CO2 SERPL-SCNC: 25 MMOL/L (ref 20–29)
CREAT SERPL-MCNC: 0.94 MG/DL (ref 0.57–1)
CREAT UR-MCNC: 151.1 MG/DL
EST. AVERAGE GLUCOSE BLD GHB EST-MCNC: 166 MG/DL
GLOBULIN SER-MCNC: 2.2 G/DL (ref 1.5–4.5)
GLUCOSE SERPL-MCNC: 122 MG/DL (ref 65–99)
HBA1C MFR BLD: 7.4 % (ref 4.8–5.6)
MICROALBUMIN UR-MCNC: 15.9 UG/ML
POTASSIUM SERPL-SCNC: 4.5 MMOL/L (ref 3.5–5.2)
PROT SERPL-MCNC: 6.9 G/DL (ref 6–8.5)
SL AMB EGFR AFRICAN AMERICAN: 78 ML/MIN/1.73
SL AMB EGFR NON AFRICAN AMERICAN: 68 ML/MIN/1.73
SODIUM SERPL-SCNC: 138 MMOL/L (ref 134–144)
T4 FREE SERPL-MCNC: 0.94 NG/DL (ref 0.82–1.77)
TSH SERPL DL<=0.005 MIU/L-ACNC: 24.8 UIU/ML (ref 0.45–4.5)

## 2021-08-06 ENCOUNTER — OFFICE VISIT (OUTPATIENT)
Dept: ENDOCRINOLOGY | Facility: HOSPITAL | Age: 56
End: 2021-08-06
Payer: COMMERCIAL

## 2021-08-06 VITALS
SYSTOLIC BLOOD PRESSURE: 120 MMHG | HEART RATE: 92 BPM | DIASTOLIC BLOOD PRESSURE: 76 MMHG | WEIGHT: 179.6 LBS | HEIGHT: 62 IN | BODY MASS INDEX: 33.05 KG/M2

## 2021-08-06 DIAGNOSIS — E78.5 HYPERLIPIDEMIA, UNSPECIFIED HYPERLIPIDEMIA TYPE: ICD-10-CM

## 2021-08-06 DIAGNOSIS — E03.9 HYPOTHYROIDISM, UNSPECIFIED TYPE: ICD-10-CM

## 2021-08-06 DIAGNOSIS — I10 ESSENTIAL HYPERTENSION: ICD-10-CM

## 2021-08-06 DIAGNOSIS — E11.8 TYPE 2 DIABETES MELLITUS WITH COMPLICATION, WITHOUT LONG-TERM CURRENT USE OF INSULIN (HCC): Primary | ICD-10-CM

## 2021-08-06 PROCEDURE — 99214 OFFICE O/P EST MOD 30 MIN: CPT | Performed by: NURSE PRACTITIONER

## 2021-08-06 NOTE — PROGRESS NOTES
Esteban Leon 63 y o  female MRN: [id number]    Encounter: [id number]      Assessment/Plan     Assessment: This is a 63y o -year-old female with type 2 diabetes, hypothyroidism, hypertension and hyperlipidemia  Plan:  1  Type 2 diabetes:  Her most recent hemoglobin A1c is improved to 7 4   There are no blood sugar records available for review  For now, I have asked her to continue Ozempic, Jardiance and Actos at current dose   Discussed the importance of a proper diabetic diet and exercise  I have asked her to check her blood sugars at least twice daily at alternating times and send a record to the office in 2 weeks for review  Reviewed recognition and proper treatment of hypoglycemic episodes   Check hemoglobin A1c prior to next visit      2  Hypothyroidism: Her TSH is elevated to 24 8 with a low normal free T4  She states that she sometimes misses her dose of levothyroxine but has also been taking it with a multivitamin in the morning  Discussed the proper process for taking her levothyroxine  We will recheck her TSH and free T4 in 6 weeks to reassess  We will contact her with results and any applicable changes to her regimen, if necessary  Also check TSH and free T4 prior to next visit          3  Hypertension:  She is normotensive in the office today  Continue losartan   Check comprehensive metabolic panel prior to next visit      4  Hyperlipidemia:  Continue atorvastatin  Check fasting lipid panel prior to next visit      CC:   Type 2 Diabetes follow-up    History of Present Illness     HPI:  63 y o  female with type 2 diabetes for 10 years   She is on oral agents at home and takes Actos 30 mg daily, Ozempic 1 mg weekly and Jardiance 25 mg daily   Her most recent hemoglobin A1c from August 4, 2021 is 7 4   She denies any polyuria, polydipsia, nocturia and blurry vision   She denies neuropathy, nephropathy and retinopathy   She states that she did not tolerate metformin or Januvia in the past   She continues to work on positive lifestyle changes with regard to her diet and exercise       Hypoglycemic episodes: None       Most recent diabetic eye exam was obtained on October 12, 2020 with mild stable retinopathy in the right eye  She has no complaints about her feet and does not follow Podiatry for regular diabetic foot care   Diabetic foot exam was performed at office visit on October 23, 2020       Blood Sugar/Glucometer/Pump/CGM review:  She presents with no blood sugar records or meter for download in the office today      For hyperlipidemia takes atorvastatin 40 mg daily        For hypothyroidism she takes levothyroxine 150 mcg daily  She complains of some fatigue   Her most recent TSH from August 4, 2021 is 24 800 with a free T4 of and 0 94  She has missed a few doses of Levothyroxine but has also been taking her multivitamin with her dose in the AM      For hypertension she takes losartan 50 mg daily       Review of Systems   Constitutional: Positive for fatigue  Negative for chills and fever  HENT: Negative  Negative for trouble swallowing and voice change  Eyes: Negative  Negative for visual disturbance  Respiratory: Negative  Negative for chest tightness and shortness of breath  Cardiovascular: Negative  Negative for chest pain  Gastrointestinal: Negative  Negative for abdominal pain, constipation, diarrhea and vomiting  Endocrine: Positive for polyuria ( 1-2 times per night nocturia)  Negative for cold intolerance, heat intolerance, polydipsia and polyphagia  Genitourinary: Negative  Musculoskeletal: Negative  Skin: Negative  Allergic/Immunologic: Negative  Neurological: Negative  Negative for dizziness, syncope, light-headedness and headaches  Hematological: Negative  Psychiatric/Behavioral: Negative  All other systems reviewed and are negative  Historical Information   No past medical history on file  No past surgical history on file    Social History   Social History     Substance and Sexual Activity   Alcohol Use Not Currently     Social History     Substance and Sexual Activity   Drug Use Never     Social History     Tobacco Use   Smoking Status Current Every Day Smoker    Packs/day: 0 50    Years: 30 00    Pack years: 15 00    Types: Cigarettes   Smokeless Tobacco Never Used   Tobacco Comment    working on quitting, goes a few days without it and then has some emotional smoking      Family History:   Family History   Problem Relation Age of Onset    Schizoaffective Disorder  Mother     Diabetes unspecified Father     Hyperlipidemia Father     Hypertension Father        Meds/Allergies   Current Outpatient Medications   Medication Sig Dispense Refill    Accu-Chek Softclix Lancets lancets Test blood sugar twice daily  200 each 3    atorvastatin (LIPITOR) 40 mg tablet Take 1 tablet (40 mg total) by mouth daily 90 tablet 3    Blood Glucose Monitoring Suppl (Accu-Chek Guide) w/Device KIT by Does not apply route once for 1 dose Use glucometer to check blood sugars daily  1 kit 0    Blood Glucose Monitoring Suppl (Polo Shear) w/Device KIT by Does not apply route once for 1 dose Use glucometer to check blood sugar daily  1 kit 0    buPROPion (WELLBUTRIN XL) 300 mg 24 hr tablet Take 300 mg by mouth daily  0    Empagliflozin (Jardiance) 25 MG TABS Take 1 tablet (25 mg total) by mouth every morning 90 tablet 3    glucose blood (Accu-Chek Guide) test strip Test blood sugars twice daily   200 each 3    LORazepam (ATIVAN) 0 5 mg tablet   0    losartan (COZAAR) 50 mg tablet Take 1 tablet (50 mg total) by mouth daily 90 tablet 3    Multiple Vitamin (MULTIVITAMIN) capsule Take 1 capsule by mouth daily      Ozempic, 1 MG/DOSE, 2 MG/1 5ML SOPN INJECT 1MG SUBCUTANEOUSLY  ONCE WEEKLY 9 mL 1    pioglitazone (ACTOS) 30 mg tablet Take 1 tablet (30 mg total) by mouth daily 90 tablet 3    Synthroid 150 MCG tablet TAKE 1 TABLET DAILY 90 tablet 3    zolpidem (AMBIEN) 10 mg tablet       aspirin (ECOTRIN LOW STRENGTH) 81 mg EC tablet Take 81 mg by mouth daily (Patient not taking: Reported on 8/6/2021)      Continuous Blood Gluc  (FreeStyle Skyler 14 Day [place]) JACOB Use 1 Device 4 (four) times a day (before meals and at bedtime) (Patient not taking: Reported on 8/6/2021) 1 Device 0    Continuous Blood Gluc Sensor (FreeStyle Skyler 14 Day Sensor) MISC Use 1 application every 14 (fourteen) days (Patient not taking: Reported on 8/6/2021) 2 each 6    glimepiride (AMARYL) 1 mg tablet 1 tablet before breakfast and dinner (Patient not taking: Reported on 8/6/2021) 180 tablet 3     No current facility-administered medications for this visit  No Known Allergies    Objective   Vitals: Height 5' 2" (1 575 m), weight 81 5 kg (179 lb 9 6 oz)  Physical Exam  Vitals reviewed  Constitutional:       Appearance: She is well-developed  She is obese  HENT:      Head: Normocephalic and atraumatic  Eyes:      Conjunctiva/sclera: Conjunctivae normal       Pupils: Pupils are equal, round, and reactive to light  Cardiovascular:      Rate and Rhythm: Normal rate and regular rhythm  Heart sounds: Normal heart sounds  Pulmonary:      Effort: Pulmonary effort is normal       Breath sounds: Normal breath sounds  Abdominal:      General: Bowel sounds are normal       Palpations: Abdomen is soft  Musculoskeletal:         General: Normal range of motion  Cervical back: Normal range of motion and neck supple  Skin:     General: Skin is warm and dry  Neurological:      Mental Status: She is alert and oriented to person, place, and time  Psychiatric:         Behavior: Behavior normal          Thought Content:  Thought content normal          Judgment: Judgment normal        Lab Results:   Lab Results   Component Value Date/Time    Hemoglobin A1C 7 4 (H) 08/04/2021 09:16 AM    Hemoglobin A1C 8 3 02/24/2021 12:00 AM    Hemoglobin A1C 7 7 (H) 10/19/2020 07:22 AM    BUN 22 08/04/2021 09:16 AM    BUN 23 10/19/2020 07:22 AM    Potassium 4 5 08/04/2021 09:16 AM    Potassium 4 1 10/19/2020 07:22 AM    Chloride 101 08/04/2021 09:16 AM    Chloride 103 10/19/2020 07:22 AM    CO2 25 08/04/2021 09:16 AM    CO2 23 10/19/2020 07:22 AM    Creatinine 0 94 08/04/2021 09:16 AM    Creatinine 0 69 10/19/2020 07:22 AM    AST 21 08/04/2021 09:16 AM    AST 14 10/19/2020 07:22 AM    ALT 23 08/04/2021 09:16 AM    ALT 24 10/19/2020 07:22 AM    Albumin 4 7 08/04/2021 09:16 AM    Albumin 4 3 10/19/2020 07:22 AM    Globulin, Total 2 2 08/04/2021 09:16 AM    Globulin, Total 2 3 10/19/2020 07:22 AM     Portions of the record may have been created with voice recognition software  Occasional wrong word or "sound a like" substitutions may have occurred due to the inherent limitations of voice recognition software  Read the chart carefully and recognize, using context, where substitutions have occurred

## 2021-08-06 NOTE — PATIENT INSTRUCTIONS
Be mindful of diet      Stay active and stay hydrated      Continue Ozempic,  Actos and Jardiance at current dose      KEEP UP THE GOOD WORK!!!!     Contact the office with any issues, side effects or episodes of consistent hypoglycemia      Check your blood sugars regularly and send a record to the office in 2 weeks for review      Continue levothyroxine at current dose  Be sure to wait 30 minutes before eating and move multivitamin to either dinner or bedtime, as discussed      Continue losartan and atorvastatin      Complete lab work as prescribed

## 2021-08-30 DIAGNOSIS — E11.8 TYPE 2 DIABETES MELLITUS WITH COMPLICATION, WITHOUT LONG-TERM CURRENT USE OF INSULIN (HCC): ICD-10-CM

## 2021-08-30 RX ORDER — SEMAGLUTIDE 1.34 MG/ML
INJECTION, SOLUTION SUBCUTANEOUS
Qty: 9 ML | Refills: 1 | Status: SHIPPED | OUTPATIENT
Start: 2021-08-30 | End: 2022-03-07 | Stop reason: SDUPTHER

## 2021-12-10 ENCOUNTER — OFFICE VISIT (OUTPATIENT)
Dept: ENDOCRINOLOGY | Facility: HOSPITAL | Age: 56
End: 2021-12-10
Payer: COMMERCIAL

## 2021-12-10 VITALS
WEIGHT: 182.2 LBS | HEIGHT: 62 IN | SYSTOLIC BLOOD PRESSURE: 124 MMHG | DIASTOLIC BLOOD PRESSURE: 80 MMHG | BODY MASS INDEX: 33.53 KG/M2 | HEART RATE: 112 BPM

## 2021-12-10 DIAGNOSIS — E78.5 HYPERLIPIDEMIA, UNSPECIFIED HYPERLIPIDEMIA TYPE: ICD-10-CM

## 2021-12-10 DIAGNOSIS — I10 ESSENTIAL HYPERTENSION: ICD-10-CM

## 2021-12-10 DIAGNOSIS — E11.8 TYPE 2 DIABETES MELLITUS WITH COMPLICATION, WITHOUT LONG-TERM CURRENT USE OF INSULIN (HCC): Primary | ICD-10-CM

## 2021-12-10 DIAGNOSIS — E03.9 HYPOTHYROIDISM, UNSPECIFIED TYPE: ICD-10-CM

## 2021-12-10 LAB
ALBUMIN SERPL-MCNC: 4.7 G/DL (ref 3.8–4.9)
ALBUMIN/GLOB SERPL: 1.9 {RATIO} (ref 1.2–2.2)
ALP SERPL-CCNC: 83 IU/L (ref 44–121)
ALT SERPL-CCNC: 24 IU/L (ref 0–32)
AST SERPL-CCNC: 17 IU/L (ref 0–40)
BASOPHILS # BLD AUTO: 0.1 X10E3/UL (ref 0–0.2)
BASOPHILS NFR BLD AUTO: 0 %
BILIRUB SERPL-MCNC: 0.5 MG/DL (ref 0–1.2)
BUN SERPL-MCNC: 19 MG/DL (ref 6–24)
BUN/CREAT SERPL: 27 (ref 9–23)
CALCIUM SERPL-MCNC: 10 MG/DL (ref 8.7–10.2)
CHLORIDE SERPL-SCNC: 100 MMOL/L (ref 96–106)
CHOLEST SERPL-MCNC: 193 MG/DL (ref 100–199)
CO2 SERPL-SCNC: 26 MMOL/L (ref 20–29)
CREAT SERPL-MCNC: 0.71 MG/DL (ref 0.57–1)
EOSINOPHIL # BLD AUTO: 0.2 X10E3/UL (ref 0–0.4)
EOSINOPHIL NFR BLD AUTO: 1 %
ERYTHROCYTE [DISTWIDTH] IN BLOOD BY AUTOMATED COUNT: 12.9 % (ref 11.7–15.4)
EST. AVERAGE GLUCOSE BLD GHB EST-MCNC: 177 MG/DL
GLOBULIN SER-MCNC: 2.5 G/DL (ref 1.5–4.5)
GLUCOSE SERPL-MCNC: 173 MG/DL (ref 65–99)
HBA1C MFR BLD: 7.8 % (ref 4.8–5.6)
HCT VFR BLD AUTO: 44 % (ref 34–46.6)
HDLC SERPL-MCNC: 49 MG/DL
HGB BLD-MCNC: 15.1 G/DL (ref 11.1–15.9)
IMM GRANULOCYTES # BLD: 0 X10E3/UL (ref 0–0.1)
IMM GRANULOCYTES NFR BLD: 0 %
LDLC SERPL CALC-MCNC: 103 MG/DL (ref 0–99)
LYMPHOCYTES # BLD AUTO: 2.6 X10E3/UL (ref 0.7–3.1)
LYMPHOCYTES NFR BLD AUTO: 21 %
MCH RBC QN AUTO: 30.6 PG (ref 26.6–33)
MCHC RBC AUTO-ENTMCNC: 34.3 G/DL (ref 31.5–35.7)
MCV RBC AUTO: 89 FL (ref 79–97)
MONOCYTES # BLD AUTO: 0.7 X10E3/UL (ref 0.1–0.9)
MONOCYTES NFR BLD AUTO: 6 %
NEUTROPHILS # BLD AUTO: 8.9 X10E3/UL (ref 1.4–7)
NEUTROPHILS NFR BLD AUTO: 72 %
PLATELET # BLD AUTO: 256 X10E3/UL (ref 150–450)
POTASSIUM SERPL-SCNC: 4.1 MMOL/L (ref 3.5–5.2)
PROT SERPL-MCNC: 7.2 G/DL (ref 6–8.5)
RBC # BLD AUTO: 4.94 X10E6/UL (ref 3.77–5.28)
SL AMB EGFR AFRICAN AMERICAN: 109 ML/MIN/1.73
SL AMB EGFR NON AFRICAN AMERICAN: 95 ML/MIN/1.73
SL AMB VLDL CHOLESTEROL CALC: 41 MG/DL (ref 5–40)
SODIUM SERPL-SCNC: 142 MMOL/L (ref 134–144)
T4 FREE SERPL-MCNC: 1.78 NG/DL (ref 0.82–1.77)
TRIGL SERPL-MCNC: 240 MG/DL (ref 0–149)
TSH SERPL DL<=0.005 MIU/L-ACNC: 0.09 UIU/ML (ref 0.45–4.5)
WBC # BLD AUTO: 12.4 X10E3/UL (ref 3.4–10.8)

## 2021-12-10 PROCEDURE — 99214 OFFICE O/P EST MOD 30 MIN: CPT | Performed by: NURSE PRACTITIONER

## 2021-12-10 RX ORDER — PIOGLITAZONEHYDROCHLORIDE 45 MG/1
45 TABLET ORAL DAILY
Qty: 90 TABLET | Refills: 3 | Status: SHIPPED | OUTPATIENT
Start: 2021-12-10

## 2022-03-07 DIAGNOSIS — E11.8 TYPE 2 DIABETES MELLITUS WITH COMPLICATION, WITHOUT LONG-TERM CURRENT USE OF INSULIN (HCC): ICD-10-CM

## 2022-03-07 RX ORDER — SEMAGLUTIDE 1.34 MG/ML
INJECTION, SOLUTION SUBCUTANEOUS
Qty: 9 ML | Refills: 1 | Status: SHIPPED | OUTPATIENT
Start: 2022-03-07 | End: 2022-08-04

## 2022-08-03 DIAGNOSIS — E11.8 TYPE 2 DIABETES MELLITUS WITH COMPLICATION, WITHOUT LONG-TERM CURRENT USE OF INSULIN (HCC): ICD-10-CM

## 2022-08-04 RX ORDER — SEMAGLUTIDE 1.34 MG/ML
INJECTION, SOLUTION SUBCUTANEOUS
Qty: 9 ML | Refills: 1 | Status: SHIPPED | OUTPATIENT
Start: 2022-08-04 | End: 2022-09-29 | Stop reason: CLARIF

## 2022-08-12 DIAGNOSIS — E03.9 HYPOTHYROIDISM, UNSPECIFIED TYPE: ICD-10-CM

## 2022-08-12 DIAGNOSIS — E78.5 HYPERLIPIDEMIA, UNSPECIFIED HYPERLIPIDEMIA TYPE: ICD-10-CM

## 2022-08-15 RX ORDER — ATORVASTATIN CALCIUM 40 MG/1
TABLET, FILM COATED ORAL
Qty: 90 TABLET | Refills: 0 | Status: SHIPPED | OUTPATIENT
Start: 2022-08-15 | End: 2022-10-28

## 2022-08-15 RX ORDER — LEVOTHYROXINE SODIUM 150 MCG
TABLET ORAL
Qty: 90 TABLET | Refills: 0 | Status: SHIPPED | OUTPATIENT
Start: 2022-08-15

## 2022-08-17 DIAGNOSIS — E11.8 TYPE 2 DIABETES MELLITUS WITH COMPLICATION, WITHOUT LONG-TERM CURRENT USE OF INSULIN (HCC): ICD-10-CM

## 2022-08-17 RX ORDER — EMPAGLIFLOZIN 25 MG/1
TABLET, FILM COATED ORAL
Qty: 90 TABLET | Refills: 3 | Status: SHIPPED | OUTPATIENT
Start: 2022-08-17

## 2022-08-23 DIAGNOSIS — I10 ESSENTIAL HYPERTENSION: ICD-10-CM

## 2022-08-24 RX ORDER — LOSARTAN POTASSIUM 50 MG/1
TABLET ORAL
Qty: 90 TABLET | Refills: 3 | Status: SHIPPED | OUTPATIENT
Start: 2022-08-24

## 2022-09-27 LAB
ALBUMIN SERPL-MCNC: 4.2 G/DL (ref 3.8–4.9)
ALBUMIN/GLOB SERPL: 1.9 {RATIO} (ref 1.2–2.2)
ALP SERPL-CCNC: 81 IU/L (ref 44–121)
ALT SERPL-CCNC: 20 IU/L (ref 0–32)
AST SERPL-CCNC: 16 IU/L (ref 0–40)
BASOPHILS # BLD AUTO: 0.1 X10E3/UL (ref 0–0.2)
BASOPHILS NFR BLD AUTO: 1 %
BILIRUB SERPL-MCNC: 0.5 MG/DL (ref 0–1.2)
BUN SERPL-MCNC: 19 MG/DL (ref 6–24)
BUN/CREAT SERPL: 27 (ref 9–23)
CALCIUM SERPL-MCNC: 9.5 MG/DL (ref 8.7–10.2)
CHLORIDE SERPL-SCNC: 100 MMOL/L (ref 96–106)
CO2 SERPL-SCNC: 22 MMOL/L (ref 20–29)
CREAT SERPL-MCNC: 0.7 MG/DL (ref 0.57–1)
EGFR: 100 ML/MIN/1.73
EOSINOPHIL # BLD AUTO: 0.2 X10E3/UL (ref 0–0.4)
EOSINOPHIL NFR BLD AUTO: 2 %
ERYTHROCYTE [DISTWIDTH] IN BLOOD BY AUTOMATED COUNT: 12.6 % (ref 11.7–15.4)
EST. AVERAGE GLUCOSE BLD GHB EST-MCNC: 237 MG/DL
GLOBULIN SER-MCNC: 2.2 G/DL (ref 1.5–4.5)
GLUCOSE SERPL-MCNC: 188 MG/DL (ref 65–99)
HBA1C MFR BLD: 9.9 % (ref 4.8–5.6)
HCT VFR BLD AUTO: 41.6 % (ref 34–46.6)
HGB BLD-MCNC: 14.4 G/DL (ref 11.1–15.9)
IMM GRANULOCYTES # BLD: 0 X10E3/UL (ref 0–0.1)
IMM GRANULOCYTES NFR BLD: 0 %
LYMPHOCYTES # BLD AUTO: 2.5 X10E3/UL (ref 0.7–3.1)
LYMPHOCYTES NFR BLD AUTO: 26 %
MCH RBC QN AUTO: 31 PG (ref 26.6–33)
MCHC RBC AUTO-ENTMCNC: 34.6 G/DL (ref 31.5–35.7)
MCV RBC AUTO: 90 FL (ref 79–97)
MONOCYTES # BLD AUTO: 0.7 X10E3/UL (ref 0.1–0.9)
MONOCYTES NFR BLD AUTO: 7 %
NEUTROPHILS # BLD AUTO: 6.3 X10E3/UL (ref 1.4–7)
NEUTROPHILS NFR BLD AUTO: 64 %
PLATELET # BLD AUTO: 236 X10E3/UL (ref 150–450)
POTASSIUM SERPL-SCNC: 4 MMOL/L (ref 3.5–5.2)
PROT SERPL-MCNC: 6.4 G/DL (ref 6–8.5)
RBC # BLD AUTO: 4.65 X10E6/UL (ref 3.77–5.28)
SODIUM SERPL-SCNC: 139 MMOL/L (ref 134–144)
T4 FREE SERPL-MCNC: 1.91 NG/DL (ref 0.82–1.77)
TSH SERPL DL<=0.005 MIU/L-ACNC: 0.12 UIU/ML (ref 0.45–4.5)
WBC # BLD AUTO: 9.8 X10E3/UL (ref 3.4–10.8)

## 2022-09-29 ENCOUNTER — OFFICE VISIT (OUTPATIENT)
Dept: ENDOCRINOLOGY | Facility: HOSPITAL | Age: 57
End: 2022-09-29
Payer: COMMERCIAL

## 2022-09-29 VITALS
HEART RATE: 92 BPM | SYSTOLIC BLOOD PRESSURE: 134 MMHG | BODY MASS INDEX: 33.6 KG/M2 | WEIGHT: 182.6 LBS | DIASTOLIC BLOOD PRESSURE: 80 MMHG | HEIGHT: 62 IN

## 2022-09-29 DIAGNOSIS — I10 ESSENTIAL HYPERTENSION: ICD-10-CM

## 2022-09-29 DIAGNOSIS — E11.8 TYPE 2 DIABETES MELLITUS WITH COMPLICATION, WITHOUT LONG-TERM CURRENT USE OF INSULIN (HCC): Primary | ICD-10-CM

## 2022-09-29 DIAGNOSIS — E03.9 HYPOTHYROIDISM, UNSPECIFIED TYPE: ICD-10-CM

## 2022-09-29 DIAGNOSIS — E78.5 HYPERLIPIDEMIA, UNSPECIFIED HYPERLIPIDEMIA TYPE: ICD-10-CM

## 2022-09-29 PROCEDURE — 99214 OFFICE O/P EST MOD 30 MIN: CPT | Performed by: NURSE PRACTITIONER

## 2022-09-29 RX ORDER — SEMAGLUTIDE 2.68 MG/ML
INJECTION, SOLUTION SUBCUTANEOUS
Qty: 3 ML | Refills: 6 | Status: SHIPPED | OUTPATIENT
Start: 2022-09-29

## 2022-09-29 RX ORDER — FLASH GLUCOSE SENSOR
1 KIT MISCELLANEOUS
Qty: 2 EACH | Refills: 6 | Status: SHIPPED | OUTPATIENT
Start: 2022-09-29

## 2022-09-29 NOTE — PROGRESS NOTES
Jonel Montoya 61 y o  female MRN: [id number]    Encounter: [id number]      Assessment/Plan     Assessment: This is a 61y o -year-old female with type 2 diabetes, hypothyroidism, hypertension and hyperlipidemia      Plan:  1  Type 2 diabetes:  Her most recent hemoglobin A1c is elevated to 9 9   There are no blood sugar records available for review  Due to the significant increase in her hemoglobin A1c I have asked her to increase her Ozempic to 2 mg and continue her current dose of both Jardiance and Actos   Discussed the importance of a proper diabetic diet, checking blood sugars regularly and exercise  I have asked her to check her blood sugars at least twice daily at alternating times and send a record to the office in 2 weeks for review   Reviewed recognition and proper treatment of hypoglycemic episodes   Check hemoglobin A1c prior to next visit      2  Hypothyroidism: Her TSH is low at 0 123 with an elevated free T4    She will continue Synthroid 150 mcg Monday through Saturday but take half tablet on Sunday  Discussed the proper process for taking her levothyroxine   We will recheck her TSH and free T4 in 6 weeks to reassess  David Hilton will contact her with results and any applicable changes to her regimen, if necessary   Also check TSH and free T4 prior to next visit          3  Hypertension:  She is normotensive in the office today   Continue losartan   Check comprehensive metabolic panel prior to next visit      4  Hyperlipidemia:  Continue atorvastatin  Check fasting lipid panel prior to next visit          CC: Type 2 Diabetes follow-up    History of Present Illness     HPI:  61 y  o  female with type 2 diabetes for 10 years   She is on oral agents at home and takes Actos 45 mg daily, Ozempic 1 mg weekly and Jardiance 25 mg daily   Her most recent hemoglobin A1c from September 26, 2022 is 9 9   She denies any polyuria, polydipsia, nocturia and blurry vision   She denies neuropathy, nephropathy and retinopathy   She states that she did not tolerate metformin or Januvia in the past    She continues to work on positive lifestyle changes with regard to her diet and exercise       Hypoglycemic episodes: None       Most recent diabetic eye exam was obtained on October 22, 2020 with mild stable retinopathy in the right eye  She has no complaints about her feet and does not follow Podiatry for regular diabetic foot care   Diabetic foot exam was performed at office visit on October 23, 2020       Blood Sugar/Glucometer/Pump/CGM review:  She presents with no blood sugar records or meter for download in the office today      For hyperlipidemia takes atorvastatin 40 mg daily        For hypothyroidism she takes levothyroxine 150 mcg daily  She complains of some fatigue   Her most recent TSH from  September 26, 2022 is  0 123 with a free T4 of and 1 91  She states that she has been compliant with her levothyroxine and taking it in the proper manner      For hypertension she takes losartan 50 mg daily       Review of Systems   Constitutional: Positive for fatigue  Negative for chills and fever  HENT: Negative  Negative for trouble swallowing and voice change  Eyes: Negative  Negative for photophobia, pain, discharge, redness, itching and visual disturbance  Respiratory: Negative  Negative for chest tightness and shortness of breath  Cardiovascular: Negative  Negative for chest pain  Gastrointestinal: Negative  Negative for abdominal pain, constipation, diarrhea and vomiting  Endocrine: Positive for polyuria (1-2 times per night nocturia)  Negative for cold intolerance, heat intolerance, polydipsia and polyphagia  Genitourinary: Negative  Musculoskeletal: Negative  Skin: Negative  Allergic/Immunologic: Negative  Neurological: Negative  Negative for dizziness, syncope, light-headedness and headaches  Hematological: Negative  Psychiatric/Behavioral: Negative      All other systems reviewed and are negative  Historical Information   History reviewed  No pertinent past medical history  History reviewed  No pertinent surgical history  Social History   Social History     Substance and Sexual Activity   Alcohol Use Not Currently     Social History     Substance and Sexual Activity   Drug Use Never     Social History     Tobacco Use   Smoking Status Current Every Day Smoker    Packs/day: 0 50    Years: 30 00    Pack years: 15 00    Types: Cigarettes   Smokeless Tobacco Never Used   Tobacco Comment    working on quitting, goes a few days without it and then has some emotional smoking      Family History:   Family History   Problem Relation Age of Onset    Schizoaffective Disorder  Mother     Diabetes unspecified Father     Hyperlipidemia Father     Hypertension Father        Meds/Allergies   Current Outpatient Medications   Medication Sig Dispense Refill    Accu-Chek Softclix Lancets lancets Test blood sugar twice daily  200 each 3    atorvastatin (LIPITOR) 40 mg tablet TAKE 1 TABLET DAILY 90 tablet 0    buPROPion (WELLBUTRIN XL) 150 mg 24 hr tablet Take 150 mg by mouth daily  0    glucose blood (Accu-Chek Guide) test strip Test blood sugars twice daily  200 each 3    Jardiance 25 MG TABS TAKE 1 TABLET EVERY MORNING 90 tablet 3    LORazepam (ATIVAN) 0 5 mg tablet   0    losartan (COZAAR) 50 mg tablet TAKE 1 TABLET DAILY 90 tablet 3    Multiple Vitamin (MULTIVITAMIN) capsule Take 1 capsule by mouth daily      Ozempic, 1 MG/DOSE, 4 MG/3ML SOPN injection pen INJECT 1MG SUBCUTANEOUSLY  ONCE A WEEK   9 mL 1    pioglitazone (ACTOS) 45 mg tablet Take 1 tablet (45 mg total) by mouth daily 90 tablet 3    Synthroid 150 MCG tablet TAKE 1 TABLET DAILY 90 tablet 0    zolpidem (AMBIEN) 10 mg tablet       aspirin (ECOTRIN LOW STRENGTH) 81 mg EC tablet Take 81 mg by mouth daily (Patient not taking: No sig reported)      Blood Glucose Monitoring Suppl (Accu-Chek Guide) w/Device KIT by Does not apply route once for 1 dose Use glucometer to check blood sugars daily  1 kit 0    Continuous Blood Gluc  (FreeStyle Skyler 14 Day [employer]) JACOB Use 1 Device 4 (four) times a day (before meals and at bedtime) (Patient not taking: No sig reported) 1 Device 0    Continuous Blood Gluc Sensor (FreeStyle Skyler 14 Day Sensor) MISC Use 1 application every 14 (fourteen) days (Patient not taking: No sig reported) 2 each 6     No current facility-administered medications for this visit  No Known Allergies    Objective   Vitals: Blood pressure 134/80, pulse 92, height 5' 2" (1 575 m), weight 82 8 kg (182 lb 9 6 oz)  Physical Exam  Vitals reviewed  Constitutional:       Appearance: She is well-developed  She is obese  HENT:      Head: Normocephalic and atraumatic  Eyes:      Conjunctiva/sclera: Conjunctivae normal       Pupils: Pupils are equal, round, and reactive to light  Cardiovascular:      Rate and Rhythm: Normal rate and regular rhythm  Pulses: no weak pulses          Dorsalis pedis pulses are 1+ on the right side and 1+ on the left side  Posterior tibial pulses are 1+ on the right side and 1+ on the left side  Heart sounds: Normal heart sounds  Pulmonary:      Effort: Pulmonary effort is normal       Breath sounds: Normal breath sounds  Abdominal:      General: Bowel sounds are normal       Palpations: Abdomen is soft  Musculoskeletal:         General: Normal range of motion  Cervical back: Normal range of motion and neck supple  Feet:      Right foot:      Skin integrity: No ulcer, skin breakdown, erythema, warmth, callus or dry skin  Left foot:      Skin integrity: No ulcer, skin breakdown, erythema, warmth, callus or dry skin  Skin:     General: Skin is warm and dry  Neurological:      Mental Status: She is alert and oriented to person, place, and time  Psychiatric:         Behavior: Behavior normal          Thought Content:  Thought content normal  Judgment: Judgment normal        Patient's shoes and socks removed  Right Foot/Ankle   Right Foot Inspection  Skin Exam: skin normal and skin intact  No dry skin, no warmth, no callus, no erythema, no maceration, no abnormal color, no pre-ulcer, no ulcer and no callus  Toe Exam: ROM and strength within normal limits  Sensory   Monofilament testing: intact    Vascular  Capillary refills: < 3 seconds  The right DP pulse is 1+  The right PT pulse is 1+  Left Foot/Ankle  Left Foot Inspection  Skin Exam: skin normal and skin intact  No dry skin, no warmth, no erythema, no maceration, normal color, no pre-ulcer, no ulcer and no callus  Toe Exam: ROM and strength within normal limits  Sensory   Monofilament testing: intact    Vascular  Capillary refills: < 3 seconds  The left DP pulse is 1+  The left PT pulse is 1+       Assign Risk Category  No deformity present  No loss of protective sensation  No weak pulses  Risk: 0      Lab Results:   Lab Results   Component Value Date/Time    Hemoglobin A1C 9 9 (H) 09/26/2022 08:35 AM    Hemoglobin A1C 7 8 (H) 12/09/2021 08:24 AM    White Blood Cell Count 9 8 09/26/2022 08:35 AM    White Blood Cell Count 12 4 (H) 12/09/2021 08:24 AM    Hemoglobin 14 4 09/26/2022 08:35 AM    Hemoglobin 15 1 12/09/2021 08:24 AM    HCT 41 6 09/26/2022 08:35 AM    HCT 44 0 12/09/2021 08:24 AM    MCV 90 09/26/2022 08:35 AM    MCV 89 12/09/2021 08:24 AM    Platelet Count 398 58/01/5023 08:35 AM    Platelet Count 842 51/08/2356 08:24 AM    BUN 19 09/26/2022 08:35 AM    BUN 19 12/09/2021 08:24 AM    Potassium 4 0 09/26/2022 08:35 AM    Potassium 4 1 12/09/2021 08:24 AM    Chloride 100 09/26/2022 08:35 AM    Chloride 100 12/09/2021 08:24 AM    CO2 22 09/26/2022 08:35 AM    CO2 26 12/09/2021 08:24 AM    Creatinine 0 70 09/26/2022 08:35 AM    Creatinine 0 71 12/09/2021 08:24 AM    AST 16 09/26/2022 08:35 AM    AST 17 12/09/2021 08:24 AM    ALT 20 09/26/2022 08:35 AM    ALT 24 12/09/2021 08:24 AM    Albumin 4 2 09/26/2022 08:35 AM    Albumin 4 7 12/09/2021 08:24 AM    Globulin, Total 2 2 09/26/2022 08:35 AM    Globulin, Total 2 5 12/09/2021 08:24 AM    HDL 49 12/09/2021 08:24 AM    Triglycerides 240 (H) 12/09/2021 08:24 AM     Portions of the record may have been created with voice recognition software  Occasional wrong word or "sound a like" substitutions may have occurred due to the inherent limitations of voice recognition software  Read the chart carefully and recognize, using context, where substitutions have occurred

## 2022-09-29 NOTE — PATIENT INSTRUCTIONS
Be mindful of diet  Stay active and stay hydrated  Continue Actos and Jardiance at current dose  Increase Ozempic to 2 mg weekly  Contact the office with any issues, side effects or episodes of consistent hypoglycemia  Utilize the [facility] 2, if able  Check your blood sugars regularly and send a record to the office in 2 weeks for review  Continue Synthroid 150 mcg Monday through Saturday and take half tablet on Sunday  Check TSH and free T4 in 6 weeks to reassess  We will contact you with results  Be sure to wait 30 minutes before eating and move multivitamin to either dinner or bedtime  Continue losartan and atorvastatin  Complete lab work as prescribed

## 2022-09-30 ENCOUNTER — TELEPHONE (OUTPATIENT)
Dept: ENDOCRINOLOGY | Facility: HOSPITAL | Age: 57
End: 2022-09-30

## 2022-09-30 NOTE — TELEPHONE ENCOUNTER
Received fax that [address] is on backorder  Per verbal from Damien jarrell to give sample  Patient is aware and will  next week

## 2022-10-27 DIAGNOSIS — E78.5 HYPERLIPIDEMIA, UNSPECIFIED HYPERLIPIDEMIA TYPE: ICD-10-CM

## 2022-10-28 RX ORDER — ATORVASTATIN CALCIUM 40 MG/1
TABLET, FILM COATED ORAL
Qty: 90 TABLET | Refills: 0 | Status: SHIPPED | OUTPATIENT
Start: 2022-10-28

## 2022-11-14 DIAGNOSIS — E03.9 HYPOTHYROIDISM, UNSPECIFIED TYPE: ICD-10-CM

## 2022-11-15 RX ORDER — LEVOTHYROXINE SODIUM 150 MCG
TABLET ORAL
Qty: 90 TABLET | Refills: 0 | Status: SHIPPED | OUTPATIENT
Start: 2022-11-15

## 2023-01-05 ENCOUNTER — NURSE TRIAGE (OUTPATIENT)
Dept: OTHER | Facility: OTHER | Age: 58
End: 2023-01-05

## 2023-01-06 NOTE — TELEPHONE ENCOUNTER
Regarding: semaglutide request  ----- Message from Kerry Hughes sent at 1/5/2023  7:54 PM EST -----  Pt called, " I am completely put out of my semaglutide to take this evening   I am not sure what to do since I called a couple pharmacies and they don't have it in stock due to the national shortage "

## 2023-01-06 NOTE — TELEPHONE ENCOUNTER
What I'm explaining is that the pharmacists should be locating it for the patients  We can try to Transition to [facility]

## 2023-01-06 NOTE — TELEPHONE ENCOUNTER
These pharmacists need to start searching around to find these meds at other pharmacies  We can call in a subsititue but at this point it may not be available as well  Lets try pressing the pharmacy for a solution first  like yesterday

## 2023-01-06 NOTE — TELEPHONE ENCOUNTER
Reason for Disposition  • Caller has medicine question only, adult not sick, AND triager answers question    Answer Assessment - Initial Assessment Questions  1  NAME of MEDICATION: "What medicine are you calling about?"      Ozempic 2 mg  2  QUESTION: "What is your question?" (e g , medication refill, side effect)      There is a national shortage  What should I do? 3  PRESCRIBING HCP: "Who prescribed it?" Reason: if prescribed by specialist, call should be referred to that group  Dr Leona Ponce  4  SYMPTOMS: "Do you have any symptoms?"      12/30 last taken   Due tomorrow  5  SEVERITY: If symptoms are present, ask "Are they mild, moderate or severe?"     She is due for a dose tomorrow      Protocols used: MEDICATION QUESTION CALL-ADULT-

## 2023-01-06 NOTE — TELEPHONE ENCOUNTER
Pt is due for her dose of ozempic tomorrow and is unable to get any due to national shortage  Will need substitute

## 2023-01-06 NOTE — TELEPHONE ENCOUNTER
I agree that they should be locating it for the patients, but they aren't willing to  Even if they were, no one has it  What dose of Trulicity?

## 2023-01-06 NOTE — TELEPHONE ENCOUNTER
Unfortunately since its a  back order pharmacies can't get it  The patient did try to find a pharmacy that has it  What can we try to substitute it with?

## 2023-01-11 DIAGNOSIS — E11.8 TYPE 2 DIABETES MELLITUS WITH COMPLICATION, WITHOUT LONG-TERM CURRENT USE OF INSULIN (HCC): Primary | ICD-10-CM

## 2023-01-11 RX ORDER — EXENATIDE 2 MG/.65ML
2 INJECTION, SUSPENSION, EXTENDED RELEASE SUBCUTANEOUS WEEKLY
Qty: 12 EACH | Refills: 0 | Status: SHIPPED | OUTPATIENT
Start: 2023-01-11

## 2023-01-18 LAB
ALBUMIN SERPL-MCNC: 4.5 G/DL (ref 3.8–4.9)
ALBUMIN/CREAT UR: 17 MG/G CREAT (ref 0–29)
ALBUMIN/GLOB SERPL: 1.9 {RATIO} (ref 1.2–2.2)
ALP SERPL-CCNC: 82 IU/L (ref 44–121)
ALT SERPL-CCNC: 23 IU/L (ref 0–32)
AST SERPL-CCNC: 18 IU/L (ref 0–40)
BASOPHILS # BLD AUTO: 0.1 X10E3/UL (ref 0–0.2)
BASOPHILS NFR BLD AUTO: 1 %
BILIRUB SERPL-MCNC: 0.5 MG/DL (ref 0–1.2)
BUN SERPL-MCNC: 12 MG/DL (ref 6–24)
BUN/CREAT SERPL: 17 (ref 9–23)
CALCIUM SERPL-MCNC: 9.6 MG/DL (ref 8.7–10.2)
CHLORIDE SERPL-SCNC: 100 MMOL/L (ref 96–106)
CHOLEST SERPL-MCNC: 262 MG/DL (ref 100–199)
CO2 SERPL-SCNC: 23 MMOL/L (ref 20–29)
CREAT SERPL-MCNC: 0.71 MG/DL (ref 0.57–1)
CREAT UR-MCNC: 148.6 MG/DL
EGFR: 98 ML/MIN/1.73
EOSINOPHIL # BLD AUTO: 0.2 X10E3/UL (ref 0–0.4)
EOSINOPHIL NFR BLD AUTO: 2 %
ERYTHROCYTE [DISTWIDTH] IN BLOOD BY AUTOMATED COUNT: 13.2 % (ref 11.7–15.4)
EST. AVERAGE GLUCOSE BLD GHB EST-MCNC: 197 MG/DL
GLOBULIN SER-MCNC: 2.4 G/DL (ref 1.5–4.5)
GLUCOSE SERPL-MCNC: 217 MG/DL (ref 70–99)
HBA1C MFR BLD: 8.5 % (ref 4.8–5.6)
HCT VFR BLD AUTO: 46.3 % (ref 34–46.6)
HDLC SERPL-MCNC: 47 MG/DL
HGB BLD-MCNC: 15.7 G/DL (ref 11.1–15.9)
IMM GRANULOCYTES # BLD: 0 X10E3/UL (ref 0–0.1)
IMM GRANULOCYTES NFR BLD: 0 %
LDLC SERPL CALC-MCNC: 144 MG/DL (ref 0–99)
LYMPHOCYTES # BLD AUTO: 2.5 X10E3/UL (ref 0.7–3.1)
LYMPHOCYTES NFR BLD AUTO: 29 %
MCH RBC QN AUTO: 30.3 PG (ref 26.6–33)
MCHC RBC AUTO-ENTMCNC: 33.9 G/DL (ref 31.5–35.7)
MCV RBC AUTO: 89 FL (ref 79–97)
MICROALBUMIN UR-MCNC: 24.6 UG/ML
MONOCYTES # BLD AUTO: 0.6 X10E3/UL (ref 0.1–0.9)
MONOCYTES NFR BLD AUTO: 6 %
NEUTROPHILS # BLD AUTO: 5.3 X10E3/UL (ref 1.4–7)
NEUTROPHILS NFR BLD AUTO: 62 %
PLATELET # BLD AUTO: 243 X10E3/UL (ref 150–450)
POTASSIUM SERPL-SCNC: 4.1 MMOL/L (ref 3.5–5.2)
PROT SERPL-MCNC: 6.9 G/DL (ref 6–8.5)
RBC # BLD AUTO: 5.19 X10E6/UL (ref 3.77–5.28)
SL AMB VLDL CHOLESTEROL CALC: 71 MG/DL (ref 5–40)
SODIUM SERPL-SCNC: 140 MMOL/L (ref 134–144)
T4 FREE SERPL-MCNC: 1.7 NG/DL (ref 0.82–1.77)
TRIGL SERPL-MCNC: 383 MG/DL (ref 0–149)
TSH SERPL DL<=0.005 MIU/L-ACNC: 0.6 UIU/ML (ref 0.45–4.5)
WBC # BLD AUTO: 8.7 X10E3/UL (ref 3.4–10.8)

## 2023-01-19 ENCOUNTER — DOCUMENTATION (OUTPATIENT)
Dept: ENDOCRINOLOGY | Facility: HOSPITAL | Age: 58
End: 2023-01-19

## 2023-01-20 DIAGNOSIS — E11.8 TYPE 2 DIABETES MELLITUS WITH COMPLICATION, WITHOUT LONG-TERM CURRENT USE OF INSULIN (HCC): Primary | ICD-10-CM

## 2023-01-20 RX ORDER — DULAGLUTIDE 3 MG/.5ML
3 INJECTION, SOLUTION SUBCUTANEOUS
Qty: 6 ML | Refills: 1 | Status: SHIPPED | OUTPATIENT
Start: 2023-01-20

## 2023-01-20 NOTE — PROGRESS NOTES
Prior auth for Bydureon was denied because she did not try and fail Ozempic, Rybelsus, Trulicity or Victoza  We did try Ozempic but it's on backorder so she technically didn't fail it  Left message for patient to call back

## 2023-01-27 ENCOUNTER — OFFICE VISIT (OUTPATIENT)
Dept: ENDOCRINOLOGY | Facility: HOSPITAL | Age: 58
End: 2023-01-27

## 2023-01-27 VITALS
WEIGHT: 173 LBS | HEIGHT: 62 IN | BODY MASS INDEX: 31.83 KG/M2 | DIASTOLIC BLOOD PRESSURE: 70 MMHG | HEART RATE: 100 BPM | SYSTOLIC BLOOD PRESSURE: 120 MMHG

## 2023-01-27 DIAGNOSIS — I10 ESSENTIAL HYPERTENSION: ICD-10-CM

## 2023-01-27 DIAGNOSIS — E11.8 TYPE 2 DIABETES MELLITUS WITH COMPLICATION, WITHOUT LONG-TERM CURRENT USE OF INSULIN (HCC): ICD-10-CM

## 2023-01-27 DIAGNOSIS — E03.9 HYPOTHYROIDISM, UNSPECIFIED TYPE: Primary | ICD-10-CM

## 2023-01-27 DIAGNOSIS — E78.5 HYPERLIPIDEMIA, UNSPECIFIED HYPERLIPIDEMIA TYPE: ICD-10-CM

## 2023-01-27 RX ORDER — AMOXICILLIN AND CLAVULANATE POTASSIUM 875; 125 MG/1; MG/1
1 TABLET, FILM COATED ORAL EVERY 12 HOURS
COMMUNITY
Start: 2023-01-20

## 2023-01-27 RX ORDER — FLASH GLUCOSE SENSOR
1 KIT MISCELLANEOUS
Qty: 2 EACH | Refills: 6 | Status: SHIPPED | OUTPATIENT
Start: 2023-01-27

## 2023-01-27 NOTE — PATIENT INSTRUCTIONS
Be mindful of diet  Stay active and stay hydrated  Continue Actos and Jardiance at current dose  Continue Trulicity 3mg weekly  Contact the office with any issues, side effects or episodes of consistent hypoglycemia  Continue to utilize the [facility] 2  Check your blood sugars 4X daily and send a record to the office in 2 weeks for review, as discussed  Continue Synthroid 150 mcg Monday through Saturday and take half tablet on Sunday  Be sure to wait 30 minutes before eating and move multivitamin to either dinner or bedtime  Continue losartan and atorvastatin  Complete lab work as prescribed

## 2023-01-29 DIAGNOSIS — E03.9 HYPOTHYROIDISM, UNSPECIFIED TYPE: ICD-10-CM

## 2023-01-30 RX ORDER — LEVOTHYROXINE SODIUM 150 MCG
TABLET ORAL
Qty: 90 TABLET | Refills: 0 | Status: SHIPPED | OUTPATIENT
Start: 2023-01-30

## 2023-06-01 DIAGNOSIS — E11.8 TYPE 2 DIABETES MELLITUS WITH COMPLICATION, WITHOUT LONG-TERM CURRENT USE OF INSULIN (HCC): ICD-10-CM

## 2023-06-01 RX ORDER — PIOGLITAZONEHYDROCHLORIDE 45 MG/1
45 TABLET ORAL DAILY
Qty: 90 TABLET | Refills: 0 | Status: SHIPPED | OUTPATIENT
Start: 2023-06-01

## 2023-06-01 NOTE — TELEPHONE ENCOUNTER
Patient l/m requesting refill of Skyler sensors to [employer] and Actos to mail order pharmacy  She also asked for a call back to confirm her appointment time   I did call back and leave that on her voice mail

## 2023-06-03 DIAGNOSIS — E11.8 TYPE 2 DIABETES MELLITUS WITH COMPLICATION, WITHOUT LONG-TERM CURRENT USE OF INSULIN (HCC): ICD-10-CM

## 2023-06-05 RX ORDER — DULAGLUTIDE 3 MG/.5ML
INJECTION, SOLUTION SUBCUTANEOUS
Qty: 6 ML | Refills: 1 | Status: SHIPPED | OUTPATIENT
Start: 2023-06-05

## 2023-06-09 NOTE — PROGRESS NOTES
Louann Hobbs 61 y o  female MRN: [id number]    Encounter: [id number]      Assessment/Plan     Assessment: This is a 61y o -year-old female with type 2 diabetes, hypothyroidism, hypertension and hyperlipidemia      Plan:  1  Type 2 diabetes:  Her most recent hemoglobin A1c is elevated at 8 5 but is improved  Download of her most recent [facility] report is relatively controlled  She has been without her Ozempic for approximately 6 weeks and recovering from COVID and an ankle injury, as well  For now, continue current regimen  Discussed the importance of a proper diabetic diet, checking blood sugars regularly and exercise  She now has [employer] sensors  She will check her blood sugars 4 times daily and send a record to the office in 2 weeks for review   Reviewed recognition and proper treatment of hypoglycemic episodes   Check hemoglobin A1c prior to next visit      2  Hypothyroidism: Her TSH and Free T4 are normal    She will continue Synthroid 150 mcg Monday through Saturday with half tablet on Sunday  Discussed the proper process for taking her levothyroxine   We will recheck her TSH and free T4 in 6 weeks to reassess  Chely Hagan will contact her with results and any applicable changes to her regimen, if necessary   Also check TSH and free T4 prior to next visit          3  Hypertension:  She is normotensive in the office today   Continue losartan   Check comprehensive metabolic panel prior to next visit      4  Hyperlipidemia:  Continue atorvastatin  Discussed the importance of consistency and compliance with her medication  CC: Type 2 Diabetes follow up    History of Present Illness     HPI:  56 y  o  female with type 2 diabetes for 10 years   She is on oral agents at home and takes NJEVP 57 BARBRA daily, Trulicity 3 mg weekly and Jardiance 25 mg daily   Her most recent hemoglobin A1c from January 17, 2023 is 8 5   She denies any polyuria, polydipsia, nocturia and blurry vision   She denies neuropathy, Note addended  Form signed to refax nephropathy and retinopathy   She states that she did not tolerate metformin or Januvia in the past    She is recovering from a fall with a fractured ankle and simultaneous COVID       Hypoglycemic episodes: None       Most recent diabetic eye exam was obtained on October 22, 2020 with mild stable retinopathy in the right eye  She has no complaints about her feet and does not follow Podiatry for regular diabetic foot care   Diabetic foot exam was performed at office visit on September 29, 2022       Blood Sugar/Glucometer/Pump/CGM review:  Jamison Boles was reviewed from November 24 through December 7, 2022  She is in target range 70% with 30% elevated readings and no hypoglycemia      For hyperlipidemia takes atorvastatin 40 mg daily        For hypothyroidism she takes levothyroxine 150 mcg daily Monday through Saturday with a half tablet on Sunday  She complains of some fatigue   Her most recent TSH from January 17, 2023 is 0 596 with a free T4 of and 1 70  She states that she has been compliant with her levothyroxine and taking it in the proper manner      For hypertension she takes losartan 50 mg daily       Review of Systems   Constitutional: Negative  Negative for chills, fatigue and fever  HENT: Negative  Negative for trouble swallowing and voice change  Eyes: Negative  Negative for photophobia, pain, discharge, redness, itching and visual disturbance  Respiratory: Negative  Negative for chest tightness and shortness of breath  Cardiovascular: Negative  Negative for chest pain  Gastrointestinal: Negative  Negative for abdominal pain, constipation, diarrhea and vomiting  Endocrine: Negative for cold intolerance, heat intolerance, polydipsia, polyphagia and polyuria  Genitourinary: Negative  Musculoskeletal: Negative  Skin: Negative  Allergic/Immunologic: Negative  Neurological: Negative  Negative for dizziness, syncope, light-headedness and headaches     Hematological: Negative  Psychiatric/Behavioral: Negative  All other systems reviewed and are negative  Historical Information   History reviewed  No pertinent past medical history  History reviewed  No pertinent surgical history  Social History   Social History     Substance and Sexual Activity   Alcohol Use Not Currently     Social History     Substance and Sexual Activity   Drug Use Never     Social History     Tobacco Use   Smoking Status Every Day   • Packs/day: 0 50   • Years: 30 00   • Pack years: 15 00   • Types: Cigarettes   Smokeless Tobacco Never   Tobacco Comments    working on quitting, goes a few days without it and then has some emotional smoking      Family History:   Family History   Problem Relation Age of Onset   • Schizoaffective Disorder  Mother    • Diabetes unspecified Father    • Hyperlipidemia Father    • Hypertension Father        Meds/Allergies   Current Outpatient Medications   Medication Sig Dispense Refill   • Accu-Chek Softclix Lancets lancets Test blood sugar twice daily  200 each 3   • amoxicillin-clavulanate (AUGMENTIN) 875-125 mg per tablet Take 1 tablet by mouth every 12 (twelve) hours     • atorvastatin (LIPITOR) 40 mg tablet TAKE 1 TABLET DAILY 90 tablet 0   • Continuous Blood Gluc  (FreeStyle Skyler 14 Day [place]) JACOB Use 1 Device 4 (four) times a day (before meals and at bedtime) 1 Device 0   • Continuous Blood Gluc Sensor (FreeStyle [place] 2 Sensor) MISC Use 1 application 4 (four) times a day (before meals and at bedtime) 2 each 6   • dulaglutide (Trulicity) 3 UG/0 0QW injection Inject 0 5 mL (3 mg total) under the skin every 7 days 6 mL 1   • glucose blood (Accu-Chek Guide) test strip Test blood sugars twice daily   200 each 3   • Jardiance 25 MG TABS TAKE 1 TABLET EVERY MORNING 90 tablet 3   • LORazepam (ATIVAN) 0 5 mg tablet   0   • losartan (COZAAR) 50 mg tablet TAKE 1 TABLET DAILY 90 tablet 3   • Multiple Vitamin (MULTIVITAMIN) capsule Take 1 capsule by mouth daily • pioglitazone (ACTOS) 45 mg tablet Take 1 tablet (45 mg total) by mouth daily 90 tablet 3   • Semaglutide, 2 MG/DOSE, (Ozempic, 2 MG/DOSE,) 8 MG/3ML SOPN Inject 2 mg weekly 3 mL 6   • Synthroid 150 MCG tablet TAKE 1 TABLET DAILY 90 tablet 0   • zolpidem (AMBIEN) 10 mg tablet      • Blood Glucose Monitoring Suppl (Accu-Chek Guide) w/Device KIT by Does not apply route once for 1 dose Use glucometer to check blood sugars daily  1 kit 0     No current facility-administered medications for this visit  No Known Allergies    Objective   Vitals: Blood pressure 120/70, pulse 100, height 5' 2" (1 575 m), weight 78 5 kg (173 lb)  Physical Exam  Vitals reviewed  Constitutional:       Appearance: She is well-developed  HENT:      Head: Normocephalic and atraumatic  Eyes:      Conjunctiva/sclera: Conjunctivae normal       Pupils: Pupils are equal, round, and reactive to light  Cardiovascular:      Rate and Rhythm: Normal rate and regular rhythm  Heart sounds: Normal heart sounds  Pulmonary:      Effort: Pulmonary effort is normal       Breath sounds: Normal breath sounds  Abdominal:      General: Bowel sounds are normal       Palpations: Abdomen is soft  Musculoskeletal:         General: Normal range of motion  Cervical back: Normal range of motion and neck supple  Skin:     General: Skin is warm and dry  Neurological:      Mental Status: She is alert and oriented to person, place, and time  Psychiatric:         Behavior: Behavior normal          Thought Content:  Thought content normal          Judgment: Judgment normal          Lab Results:   Lab Results   Component Value Date/Time    Hemoglobin A1C 8 5 (H) 01/17/2023 09:09 AM    Hemoglobin A1C 9 9 (H) 09/26/2022 08:35 AM    White Blood Cell Count 8 7 01/17/2023 09:09 AM    White Blood Cell Count 9 8 09/26/2022 08:35 AM    Hemoglobin 15 7 01/17/2023 09:09 AM    Hemoglobin 14 4 09/26/2022 08:35 AM    HCT 46 3 01/17/2023 09:09 AM    HCT 41 6 09/26/2022 08:35 AM    MCV 89 01/17/2023 09:09 AM    MCV 90 09/26/2022 08:35 AM    Platelet Count 462 89/40/9094 09:09 AM    Platelet Count 533 57/91/6984 08:35 AM    BUN 12 01/17/2023 09:09 AM    BUN 19 09/26/2022 08:35 AM    Potassium 4 1 01/17/2023 09:09 AM    Potassium 4 0 09/26/2022 08:35 AM    Chloride 100 01/17/2023 09:09 AM    Chloride 100 09/26/2022 08:35 AM    CO2 23 01/17/2023 09:09 AM    CO2 22 09/26/2022 08:35 AM    Creatinine 0 71 01/17/2023 09:09 AM    Creatinine 0 70 09/26/2022 08:35 AM    AST 18 01/17/2023 09:09 AM    AST 16 09/26/2022 08:35 AM    ALT 23 01/17/2023 09:09 AM    ALT 20 09/26/2022 08:35 AM    Albumin 4 5 01/17/2023 09:09 AM    Albumin 4 2 09/26/2022 08:35 AM    Globulin, Total 2 4 01/17/2023 09:09 AM    Globulin, Total 2 2 09/26/2022 08:35 AM    HDL 47 01/17/2023 09:09 AM    Triglycerides 383 (H) 01/17/2023 09:09 AM     Portions of the record may have been created with voice recognition software  Occasional wrong word or "sound a like" substitutions may have occurred due to the inherent limitations of voice recognition software  Read the chart carefully and recognize, using context, where substitutions have occurred

## 2023-06-22 DIAGNOSIS — I10 ESSENTIAL HYPERTENSION: ICD-10-CM

## 2023-06-22 DIAGNOSIS — E11.8 TYPE 2 DIABETES MELLITUS WITH COMPLICATION, WITHOUT LONG-TERM CURRENT USE OF INSULIN (HCC): ICD-10-CM

## 2023-06-22 DIAGNOSIS — E03.9 HYPOTHYROIDISM, UNSPECIFIED TYPE: ICD-10-CM

## 2023-06-22 DIAGNOSIS — E78.5 HYPERLIPIDEMIA, UNSPECIFIED HYPERLIPIDEMIA TYPE: ICD-10-CM

## 2023-06-22 RX ORDER — ATORVASTATIN CALCIUM 40 MG/1
40 TABLET, FILM COATED ORAL DAILY
Qty: 90 TABLET | Refills: 1 | Status: SHIPPED | OUTPATIENT
Start: 2023-06-22

## 2023-06-22 RX ORDER — LEVOTHYROXINE SODIUM 150 MCG
150 TABLET ORAL DAILY
Qty: 90 TABLET | Refills: 1 | Status: SHIPPED | OUTPATIENT
Start: 2023-06-22

## 2023-06-22 RX ORDER — LOSARTAN POTASSIUM 50 MG/1
50 TABLET ORAL DAILY
Qty: 90 TABLET | Refills: 1 | Status: SHIPPED | OUTPATIENT
Start: 2023-06-22

## 2023-07-16 LAB
ALBUMIN SERPL-MCNC: 4.4 G/DL (ref 3.8–4.9)
ALBUMIN/GLOB SERPL: 1.8 {RATIO} (ref 1.2–2.2)
ALP SERPL-CCNC: 83 IU/L (ref 44–121)
ALT SERPL-CCNC: 22 IU/L (ref 0–32)
AST SERPL-CCNC: 20 IU/L (ref 0–40)
BASOPHILS # BLD AUTO: 0.1 X10E3/UL (ref 0–0.2)
BASOPHILS NFR BLD AUTO: 1 %
BILIRUB SERPL-MCNC: 0.6 MG/DL (ref 0–1.2)
BUN SERPL-MCNC: 15 MG/DL (ref 6–24)
BUN/CREAT SERPL: 22 (ref 9–23)
CALCIUM SERPL-MCNC: 9.9 MG/DL (ref 8.7–10.2)
CHLORIDE SERPL-SCNC: 101 MMOL/L (ref 96–106)
CO2 SERPL-SCNC: 26 MMOL/L (ref 20–29)
CREAT SERPL-MCNC: 0.68 MG/DL (ref 0.57–1)
EGFR: 101 ML/MIN/1.73
EOSINOPHIL # BLD AUTO: 0.2 X10E3/UL (ref 0–0.4)
EOSINOPHIL NFR BLD AUTO: 2 %
ERYTHROCYTE [DISTWIDTH] IN BLOOD BY AUTOMATED COUNT: 12.7 % (ref 11.7–15.4)
EST. AVERAGE GLUCOSE BLD GHB EST-MCNC: 235 MG/DL
GLOBULIN SER-MCNC: 2.4 G/DL (ref 1.5–4.5)
GLUCOSE SERPL-MCNC: 150 MG/DL (ref 70–99)
HBA1C MFR BLD: 9.8 % (ref 4.8–5.6)
HCT VFR BLD AUTO: 46.2 % (ref 34–46.6)
HGB BLD-MCNC: 15.6 G/DL (ref 11.1–15.9)
IMM GRANULOCYTES # BLD: 0 X10E3/UL (ref 0–0.1)
IMM GRANULOCYTES NFR BLD: 0 %
LYMPHOCYTES # BLD AUTO: 3 X10E3/UL (ref 0.7–3.1)
LYMPHOCYTES NFR BLD AUTO: 35 %
MCH RBC QN AUTO: 30.6 PG (ref 26.6–33)
MCHC RBC AUTO-ENTMCNC: 33.8 G/DL (ref 31.5–35.7)
MCV RBC AUTO: 91 FL (ref 79–97)
MONOCYTES # BLD AUTO: 0.6 X10E3/UL (ref 0.1–0.9)
MONOCYTES NFR BLD AUTO: 7 %
NEUTROPHILS # BLD AUTO: 4.9 X10E3/UL (ref 1.4–7)
NEUTROPHILS NFR BLD AUTO: 55 %
PLATELET # BLD AUTO: 254 X10E3/UL (ref 150–450)
POTASSIUM SERPL-SCNC: 4.1 MMOL/L (ref 3.5–5.2)
PROT SERPL-MCNC: 6.8 G/DL (ref 6–8.5)
RBC # BLD AUTO: 5.09 X10E6/UL (ref 3.77–5.28)
SODIUM SERPL-SCNC: 139 MMOL/L (ref 134–144)
T4 FREE SERPL-MCNC: 2.06 NG/DL (ref 0.82–1.77)
TSH SERPL DL<=0.005 MIU/L-ACNC: 0.04 UIU/ML (ref 0.45–4.5)
WBC # BLD AUTO: 8.8 X10E3/UL (ref 3.4–10.8)

## 2023-07-18 ENCOUNTER — OFFICE VISIT (OUTPATIENT)
Dept: ENDOCRINOLOGY | Facility: HOSPITAL | Age: 58
End: 2023-07-18
Payer: COMMERCIAL

## 2023-07-18 VITALS
DIASTOLIC BLOOD PRESSURE: 80 MMHG | BODY MASS INDEX: 32.02 KG/M2 | HEIGHT: 62 IN | OXYGEN SATURATION: 96 % | SYSTOLIC BLOOD PRESSURE: 130 MMHG | HEART RATE: 96 BPM | WEIGHT: 174 LBS

## 2023-07-18 DIAGNOSIS — E03.9 HYPOTHYROIDISM, UNSPECIFIED TYPE: ICD-10-CM

## 2023-07-18 DIAGNOSIS — E78.5 HYPERLIPIDEMIA, UNSPECIFIED HYPERLIPIDEMIA TYPE: ICD-10-CM

## 2023-07-18 DIAGNOSIS — I10 ESSENTIAL HYPERTENSION: ICD-10-CM

## 2023-07-18 DIAGNOSIS — E11.8 TYPE 2 DIABETES MELLITUS WITH COMPLICATION, WITHOUT LONG-TERM CURRENT USE OF INSULIN (HCC): Primary | ICD-10-CM

## 2023-07-18 PROCEDURE — 99214 OFFICE O/P EST MOD 30 MIN: CPT | Performed by: NURSE PRACTITIONER

## 2023-07-18 RX ORDER — LEVOTHYROXINE SODIUM 150 MCG
TABLET ORAL
Qty: 90 TABLET | Refills: 1 | Status: SHIPPED | OUTPATIENT
Start: 2023-07-18

## 2023-07-18 RX ORDER — GLIMEPIRIDE 1 MG/1
TABLET ORAL
Qty: 180 TABLET | Refills: 3 | Status: SHIPPED | OUTPATIENT
Start: 2023-07-18

## 2023-07-18 RX ORDER — PIOGLITAZONEHYDROCHLORIDE 45 MG/1
45 TABLET ORAL DAILY
Qty: 90 TABLET | Refills: 0 | Status: SHIPPED | OUTPATIENT
Start: 2023-07-18

## 2023-07-18 RX ORDER — DULAGLUTIDE 3 MG/.5ML
INJECTION, SOLUTION SUBCUTANEOUS
Qty: 6 ML | Refills: 3 | Status: SHIPPED | OUTPATIENT
Start: 2023-07-18

## 2023-07-18 NOTE — PROGRESS NOTES
Valeria Chapa 61 y.o. female MRN: [id number]    Encounter: [id number]      Assessment/Plan     Assessment: This is a 61y.o.-year-old female with type 2 diabetes, hypothyroidism, hypertension and hyperlipidemia.     Plan:  1. Type 2 diabetes:  Her most recent hemoglobin A1c is 9.8. Download of her most recent [facility] report, earlier in June reveals elevated readings in the 200-300 range. For now, continue current regimen and add glimepiride 1 mg with breakfast and dinner. Discussed the importance of aching her blood sugars regularly utilizing the freestyle srinivas. Discussed the importance of a proper diabetic diet, checking blood sugars regularly and exercise. She now has [employer] sensors. She will check her blood sugars 4 times daily and send a record to the office in 1-2 weeks for review.  Reviewed recognition and proper treatment of hypoglycemic episodes. We also do some surveillance for type 1 diabetes checking a C-peptide level, ABELARDO and insulin antibodies.  Check hemoglobin A1c prior to next visit.     2. Hypothyroidism: Her TSH is low with a normal free T4.   She will continue Synthroid 150 mcg Monday through Saturday with notablet on Sunday. Discussed the proper process for taking her levothyroxine.  We will recheck her TSH and free T4 in 6-8 weeks to reassess. Estrella Brady will contact her with results and any applicable changes to her regimen, if necessary.  Also check TSH and free T4 prior to next visit.         3. Hypertension:  She is normotensive in the office today.  Continue losartan.  Check comprehensive metabolic panel prior to next visit.     4. Hyperlipidemia:  Continue atorvastatin.  Discussed the importance of consistency and compliance with her medication. CC: Type 2 Diabetes follow up    History of Present Illness     HPI:  56 y. o. female with type 2 diabetes for 10 years.  She is on oral agents at home and takes HFYFU 30 BU daily, Trulicity 3 mg weekly and Jardiance 25 mg daily.  Her most recent hemoglobin A1c from July 15, 2023 is 9.8.  She denies any polyuria, polydipsia, nocturia and blurry vision.  She denies neuropathy, nephropathy and retinopathy.  She states that she did not tolerate metformin or Januvia in the past.       Hypoglycemic episodes: None.      Most recent diabetic eye exam was obtained on October 22, 2020 with mild stable retinopathy in the right eye. She has no complaints about her feet and does not follow Podiatry for regular diabetic foot care.  Diabetic foot exam was performed at office visit on September 29, 2022.      Blood Sugar/Glucometer/Pump/CGM review:  [facility] [place] was reviewed from November 24 through December 7, 2022. She is in target range 70% with 30% elevated readings and no hypoglycemia.     For hyperlipidemia takes atorvastatin 40 mg daily.       For hypothyroidism she takes levothyroxine 150 mcg daily Monday through Saturday with a half tablet on Sunday. She complains of some fatigue.  Her most recent TSH from July 15, 2023 is 0.041 with a free T4 of 2.06. She states that she has been compliant with her levothyroxine and taking it in the proper manner.     For hypertension she takes losartan 50 mg daily.     Review of Systems   Constitutional: Negative. Negative for chills, fatigue and fever. HENT: Negative. Negative for trouble swallowing and voice change. Eyes: Negative. Negative for photophobia, pain, discharge, redness, itching and visual disturbance. Respiratory: Negative. Negative for chest tightness and shortness of breath. Cardiovascular: Negative. Negative for chest pain. Gastrointestinal: Negative. Negative for abdominal pain, constipation, diarrhea and vomiting. Endocrine: Negative for cold intolerance, heat intolerance, polydipsia, polyphagia and polyuria. Genitourinary: Negative. Musculoskeletal: Negative. Skin: Negative. Allergic/Immunologic: Negative. Neurological: Negative.   Negative for dizziness, syncope, light-headedness and headaches. Hematological: Negative. Psychiatric/Behavioral: Negative. All other systems reviewed and are negative. Historical Information   No past medical history on file. No past surgical history on file. Social History   Social History     Substance and Sexual Activity   Alcohol Use Not Currently     Social History     Substance and Sexual Activity   Drug Use Never     Social History     Tobacco Use   Smoking Status Every Day   • Packs/day: 0.50   • Years: 30.00   • Total pack years: 15.00   • Types: Cigarettes   Smokeless Tobacco Never   Tobacco Comments    working on quitting, goes a few days without it and then has some emotional smoking      Family History:   Family History   Problem Relation Age of Onset   • Schizoaffective Disorder  Mother    • Diabetes unspecified Father    • Hyperlipidemia Father    • Hypertension Father        Meds/Allergies   Current Outpatient Medications   Medication Sig Dispense Refill   • atorvastatin (LIPITOR) 40 mg tablet Take 1 tablet (40 mg total) by mouth daily 90 tablet 1   • Continuous Blood Gluc Sensor (FreeStyle [place]yler 2 Sensor) MISC Use 1 application. 4 (four) times a day (before meals and at bedtime) 2 each 6   • Empagliflozin (Jardiance) 25 MG TABS Take 1 tablet (25 mg total) by mouth every morning 90 tablet 1   • losartan (COZAAR) 50 mg tablet Take 1 tablet (50 mg total) by mouth daily 90 tablet 1   • pioglitazone (ACTOS) 45 mg tablet Take 1 tablet (45 mg total) by mouth daily 90 tablet 0   • Synthroid 150 MCG tablet Take 1 tablet (150 mcg total) by mouth daily 90 tablet 1   • Accu-Chek Softclix Lancets lancets Test blood sugar twice daily. 200 each 3   • amoxicillin-clavulanate (AUGMENTIN) 875-125 mg per tablet Take 1 tablet by mouth every 12 (twelve) hours     • Blood Glucose Monitoring Suppl (Accu-Chek Guide) w/Device KIT by Does not apply route once for 1 dose Use glucometer to check blood sugars daily.  1 kit 0 • Continuous Blood Gluc  (FreeStyle Skyler 14 Day [place]) JACOB Use 1 Device 4 (four) times a day (before meals and at bedtime) 1 Device 0   • glucose blood (Accu-Chek Guide) test strip Test blood sugars twice daily. 200 each 3   • LORazepam (ATIVAN) 0.5 mg tablet   0   • Multiple Vitamin (MULTIVITAMIN) capsule Take 1 capsule by mouth daily     • Semaglutide, 2 MG/DOSE, (Ozempic, 2 MG/DOSE,) 8 MG/3ML SOPN Inject 2 mg weekly 3 mL 6   • Trulicity 3 JW/1.2QG injection INJECT 0.5ML (3MG TOTAL)   UNDER THE SKIN EVERY 7 DAYS 6 mL 1   • zolpidem (AMBIEN) 10 mg tablet        No current facility-administered medications for this visit. No Known Allergies    Objective   Vitals: There were no vitals taken for this visit. Physical Exam  Vitals reviewed. Constitutional:       Appearance: She is well-developed. HENT:      Head: Normocephalic and atraumatic. Eyes:      Conjunctiva/sclera: Conjunctivae normal.      Pupils: Pupils are equal, round, and reactive to light. Cardiovascular:      Rate and Rhythm: Normal rate and regular rhythm. Heart sounds: Normal heart sounds. Pulmonary:      Effort: Pulmonary effort is normal.      Breath sounds: Normal breath sounds. Abdominal:      General: Bowel sounds are normal.      Palpations: Abdomen is soft. Musculoskeletal:         General: Normal range of motion. Cervical back: Normal range of motion and neck supple. Skin:     General: Skin is warm and dry. Neurological:      Mental Status: She is alert and oriented to person, place, and time. Psychiatric:         Behavior: Behavior normal.         Thought Content: Thought content normal.         Judgment: Judgment normal.       Lab Results:   Lab Results   Component Value Date/Time    Free t4 2.06 (H) 07/15/2023 10:22 AM    Free t4 1.70 01/17/2023 09:09 AM    Free t4 1.91 (H) 09/26/2022 08:35 AM     Portions of the record may have been created with voice recognition software.  Occasional wrong word or "sound a like" substitutions may have occurred due to the inherent limitations of voice recognition software. Read the chart carefully and recognize, using context, where substitutions have occurred.

## 2023-07-18 NOTE — PATIENT INSTRUCTIONS
Be mindful of diet. Stay active and stay hydrated. Continue Actos and Jardiance at current dose. Continue Trulicity 3 mg weekly. Start Glimepiride 1 mg with breakfast and dinner. Contact the office with any issues, side effects or episodes of consistent hypoglycemia. Continue to utilize the [facility] 2. Check your blood sugars 4X daily and send a record to the office in 2 weeks for review, as discussed. Continue Synthroid 150 mcg Monday through Saturday and take NO tablet on Sunday. Check lab work in 6-8 weeks to reassess. Be sure to wait 30 minutes before eating and move multivitamin to either dinner or bedtime. Continue losartan and atorvastatin. Complete lab work as prescribed.

## 2023-08-15 ENCOUNTER — TELEPHONE (OUTPATIENT)
Dept: ENDOCRINOLOGY | Facility: HOSPITAL | Age: 58
End: 2023-08-15

## 2023-08-30 ENCOUNTER — TELEPHONE (OUTPATIENT)
Dept: ENDOCRINOLOGY | Facility: HOSPITAL | Age: 58
End: 2023-08-30

## 2023-09-05 NOTE — TELEPHONE ENCOUNTER
Patient left a message returning our phone call.  I called her back and had to leave another message

## 2023-10-09 DIAGNOSIS — E11.8 TYPE 2 DIABETES MELLITUS WITH COMPLICATION, WITHOUT LONG-TERM CURRENT USE OF INSULIN (HCC): ICD-10-CM

## 2023-10-09 DIAGNOSIS — E03.9 HYPOTHYROIDISM, UNSPECIFIED TYPE: ICD-10-CM

## 2023-10-09 NOTE — TELEPHONE ENCOUNTER
I called and left a message concerning the patient's message about refills and her and her father's appointments.   Need to verify dates of appointments and which medication because the message left was cutting in and out

## 2023-10-10 DIAGNOSIS — E03.9 HYPOTHYROIDISM, UNSPECIFIED TYPE: ICD-10-CM

## 2023-10-10 DIAGNOSIS — E11.8 TYPE 2 DIABETES MELLITUS WITH COMPLICATION, WITHOUT LONG-TERM CURRENT USE OF INSULIN (HCC): ICD-10-CM

## 2023-10-10 RX ORDER — PIOGLITAZONEHYDROCHLORIDE 45 MG/1
45 TABLET ORAL DAILY
Qty: 90 TABLET | Refills: 1 | Status: SHIPPED | OUTPATIENT
Start: 2023-10-10

## 2023-10-10 RX ORDER — LEVOTHYROXINE SODIUM 150 MCG
TABLET ORAL
Qty: 90 TABLET | Refills: 1 | Status: SHIPPED | OUTPATIENT
Start: 2023-10-10

## 2023-10-10 RX ORDER — DULAGLUTIDE 3 MG/.5ML
INJECTION, SOLUTION SUBCUTANEOUS
Qty: 6 ML | Refills: 3 | Status: SHIPPED | OUTPATIENT
Start: 2023-10-10

## 2023-10-27 ENCOUNTER — TELEPHONE (OUTPATIENT)
Dept: ENDOCRINOLOGY | Facility: HOSPITAL | Age: 58
End: 2023-10-27

## 2023-10-27 NOTE — TELEPHONE ENCOUNTER
Jordana asked for her Shaunna Destiny to be pulled, however she did say that her sensor fell off so the reading may be short. Thank you.

## 2023-11-22 ENCOUNTER — OFFICE VISIT (OUTPATIENT)
Dept: ENDOCRINOLOGY | Facility: HOSPITAL | Age: 58
End: 2023-11-22
Payer: COMMERCIAL

## 2023-11-22 VITALS
HEIGHT: 62 IN | WEIGHT: 171.6 LBS | DIASTOLIC BLOOD PRESSURE: 90 MMHG | BODY MASS INDEX: 31.58 KG/M2 | SYSTOLIC BLOOD PRESSURE: 144 MMHG | HEART RATE: 97 BPM

## 2023-11-22 DIAGNOSIS — E78.5 HYPERLIPIDEMIA, UNSPECIFIED HYPERLIPIDEMIA TYPE: ICD-10-CM

## 2023-11-22 DIAGNOSIS — E11.8 TYPE 2 DIABETES MELLITUS WITH COMPLICATION, WITHOUT LONG-TERM CURRENT USE OF INSULIN (HCC): Primary | ICD-10-CM

## 2023-11-22 DIAGNOSIS — I10 ESSENTIAL HYPERTENSION: ICD-10-CM

## 2023-11-22 DIAGNOSIS — E03.9 HYPOTHYROIDISM, UNSPECIFIED TYPE: ICD-10-CM

## 2023-11-22 LAB — SL AMB POCT HEMOGLOBIN AIC: 8.1 (ref ?–6.5)

## 2023-11-22 PROCEDURE — 83036 HEMOGLOBIN GLYCOSYLATED A1C: CPT | Performed by: NURSE PRACTITIONER

## 2023-11-22 PROCEDURE — 99214 OFFICE O/P EST MOD 30 MIN: CPT | Performed by: NURSE PRACTITIONER

## 2023-11-22 RX ORDER — DULAGLUTIDE 4.5 MG/.5ML
4.5 INJECTION, SOLUTION SUBCUTANEOUS
Qty: 2 ML | Refills: 6 | Status: SHIPPED | OUTPATIENT
Start: 2023-11-22

## 2023-11-22 NOTE — PATIENT INSTRUCTIONS
Be mindful of diet. Stay active and stay hydrated. Continue Actos and Jardiance at current dose. Increase Trulicity to 4.5 mg weekly. Contact the office with any issues, side effects or episodes of consistent hypoglycemia. Continue to utilize the [facility] 2. Check your blood sugars 4X daily and send a record to the office in 2 weeks for review, as discussed. Continue Synthroid 150 mcg Monday through Friday and take NO tablet on Saturday and Sunday. Check lab work in 6-8 weeks to reassess. Be sure to wait 30 minutes before eating and move multivitamin to either dinner or bedtime. Continue losartan and atorvastatin. Complete lab work as prescribed.

## 2023-11-22 NOTE — PROGRESS NOTES
Gabriella Luong 60 y.o. female MRN: [id number]    Encounter: [id number]      Assessment/Plan     Assessment: This is a 60y.o.-year-old female with type 2 diabetes, hypothyroidism, hypertension and hyperlipidemia. Plan:  1. Type 2 diabetes:  Her most recent hemoglobin A1c is 8.1. Download of her most recent [employer] report, earlier in June reveals elevated readings in the 200-300 range. For now, continue current regimen but increase Trulicity to 4.5 mg weekly. Discussed the importance of aching her blood sugars regularly utilizing the freestyle srinivas. Discussed the importance of a proper diabetic diet, checking blood sugars regularly and exercise. She now has [employer] Apart sensors. She will check her blood sugars 4 times daily and send a record to the office in 1-2 weeks for review. Reviewed recognition and proper treatment of hypoglycemic episodes. Check hemoglobin A1c prior to next visit. 2. Hypothyroidism: Her TSH is low with a normal free T4. She will continue Synthroid 150 mcg Monday through Friday with no tablet on Saturday and Sunday. Discussed the proper process for taking her levothyroxine. We will recheck her TSH and free T4 in 6-8 weeks to reassess. We will contact her with results and any applicable changes to her regimen, if necessary. Also check TSH and free T4 prior to next visit. 3. Hypertension: Continue losartan. Check comprehensive metabolic panel prior to next visit. 4. Hyperlipidemia:  Continue atorvastatin. Discussed the importance of consistency and compliance with her medication. CC: Type 2 Diabetes follow up    History of Present Illness     HPI:  60 y.o. female with type 2 diabetes for 10 years. She is on oral agents at home and takes Actos 45 mg daily, Trulicity 3 mg weekly and Jardiance 25 mg daily. Her most recent hemoglobin A1c POCT from today, November 22, 2023 is 8.1. She denies any polyuria, polydipsia, nocturia and blurry vision. She denies neuropathy, nephropathy and retinopathy. She states that she did not tolerate metformin or Januvia in the past.       Hypoglycemic episodes: None. Most recent diabetic eye exam was obtained on October 22, 2020 with mild stable retinopathy in the right eye. She has no complaints about her feet and does not follow Podiatry for regular diabetic foot care. Diabetic foot exam was performed at office visit on September 29, 2022. Blood Sugar/Glucometer/Pump/CGM review:  Jamison Peguero was reviewed from November 9 through November 21, 2023. She is in target range 76% with 23% elevated readings and no hypoglycemia. Unfortunately, the CGM is only worn 29% of the time. For hyperlipidemia takes atorvastatin 40 mg daily. For hypothyroidism she takes levothyroxine 150 mcg daily Monday through Saturday with a half tablet on Sunday. She complains of some fatigue. Her most recent TSH from November 16, 2023 is 0.159 with a free T4 of 1.77. She states that she has been compliant with her levothyroxine and taking it in the proper manner. For hypertension she takes losartan 50 mg daily. Review of Systems   Constitutional: Negative. Negative for chills, fatigue and fever. HENT: Negative. Negative for trouble swallowing and voice change. Eyes: Negative. Negative for photophobia, pain, discharge, redness, itching and visual disturbance. Respiratory: Negative. Negative for chest tightness and shortness of breath. Cardiovascular:  Positive for palpitations (episodic). Negative for chest pain. Gastrointestinal: Negative. Negative for abdominal pain, constipation, diarrhea and vomiting. Endocrine: Negative for cold intolerance, heat intolerance, polydipsia, polyphagia and polyuria. Genitourinary: Negative. Musculoskeletal: Negative. Skin: Negative. Allergic/Immunologic: Negative. Neurological: Negative.   Negative for dizziness, syncope, light-headedness and headaches. Hematological: Negative. Psychiatric/Behavioral: Negative. All other systems reviewed and are negative. Historical Information   History reviewed. No pertinent past medical history. History reviewed. No pertinent surgical history. Social History   Social History     Substance and Sexual Activity   Alcohol Use Not Currently     Social History     Substance and Sexual Activity   Drug Use Never     Social History     Tobacco Use   Smoking Status Every Day    Packs/day: 0.50    Years: 30.00    Total pack years: 15.00    Types: Cigarettes   Smokeless Tobacco Never   Tobacco Comments    working on quitting, goes a few days without it and then has some emotional smoking      Family History:   Family History   Problem Relation Age of Onset    Schizoaffective Disorder  Mother     Diabetes unspecified Father     Hyperlipidemia Father     Hypertension Father        Meds/Allergies   Current Outpatient Medications   Medication Sig Dispense Refill    Accu-Chek Softclix Lancets lancets Test blood sugar twice daily. 200 each 3    atorvastatin (LIPITOR) 40 mg tablet Take 1 tablet (40 mg total) by mouth daily 90 tablet 1    Continuous Blood Gluc  (FreeStyle Skyler 14 Day [place]) JACOB Use 1 Device 4 (four) times a day (before meals and at bedtime) 1 Device 0    Continuous Blood Gluc Sensor (FreeStyle Skyler 2 Sensor) MISC Use 1 application. 4 (four) times a day (before meals and at bedtime) 2 each 6    dulaglutide (Trulicity) 3 KX/2.0HF injection 3 mg weekly 6 mL 3    Empagliflozin (Jardiance) 25 MG TABS Take 1 tablet (25 mg total) by mouth every morning 90 tablet 1    glucose blood (Accu-Chek Guide) test strip Test blood sugars twice daily.  200 each 3    LORazepam (ATIVAN) 0.5 mg tablet   0    losartan (COZAAR) 50 mg tablet Take 1 tablet (50 mg total) by mouth daily 90 tablet 1    Multiple Vitamin (MULTIVITAMIN) capsule Take 1 capsule by mouth daily      pioglitazone (ACTOS) 45 mg tablet Take 1 tablet (45 mg total) by mouth daily 90 tablet 1    Synthroid 150 MCG tablet Take 1 tablet Mon-Sat. No tablet Sun. 90 tablet 1    zolpidem (AMBIEN) 10 mg tablet       Blood Glucose Monitoring Suppl (Accu-Chek Guide) w/Device KIT by Does not apply route once for 1 dose Use glucometer to check blood sugars daily. 1 kit 0    glimepiride (AMARYL) 1 mg tablet Take 1 tablet with breakfast and dinner (Patient not taking: Reported on 11/22/2023) 180 tablet 3     No current facility-administered medications for this visit. No Known Allergies    Objective   Vitals: Blood pressure 144/90, pulse 97, height 5' 2" (1.575 m), weight 77.8 kg (171 lb 9.6 oz). Physical Exam  Vitals reviewed. Constitutional:       Appearance: She is well-developed. HENT:      Head: Normocephalic and atraumatic. Eyes:      Conjunctiva/sclera: Conjunctivae normal.      Pupils: Pupils are equal, round, and reactive to light. Cardiovascular:      Rate and Rhythm: Normal rate and regular rhythm. Pulses: no weak pulses          Dorsalis pedis pulses are 1+ on the right side and 1+ on the left side. Posterior tibial pulses are 1+ on the right side and 1+ on the left side. Heart sounds: Normal heart sounds. Pulmonary:      Effort: Pulmonary effort is normal.      Breath sounds: Normal breath sounds. Abdominal:      General: Bowel sounds are normal.      Palpations: Abdomen is soft. Musculoskeletal:         General: Normal range of motion. Cervical back: Normal range of motion and neck supple. Feet:      Right foot:      Skin integrity: Dry skin present. No ulcer, skin breakdown, erythema, warmth or callus. Left foot:      Skin integrity: Dry skin present. No ulcer, skin breakdown, erythema, warmth or callus. Skin:     General: Skin is warm and dry. Neurological:      Mental Status: She is alert and oriented to person, place, and time.    Psychiatric:         Behavior: Behavior normal.         Thought Content: Thought content normal.         Judgment: Judgment normal.       Patient's shoes and socks removed. Right Foot/Ankle   Right Foot Inspection  Skin Exam: skin normal, skin intact and dry skin. No warmth, no callus, no erythema, no maceration, no abnormal color, no pre-ulcer, no ulcer and no callus. Toe Exam: ROM and strength within normal limits. Sensory   Monofilament testing: intact    Vascular  Capillary refills: < 3 seconds  The right DP pulse is 1+. The right PT pulse is 1+. Left Foot/Ankle  Left Foot Inspection  Skin Exam: skin normal, skin intact and dry skin. No warmth, no erythema, no maceration, normal color, no pre-ulcer, no ulcer and no callus. Toe Exam: ROM and strength within normal limits. Sensory   Monofilament testing: intact    Vascular  Capillary refills: < 3 seconds  The left DP pulse is 1+. The left PT pulse is 1+.      Assign Risk Category  No deformity present  No loss of protective sensation  No weak pulses          Lab Results:   Lab Results   Component Value Date/Time    Hemoglobin A1C 9.8 (H) 07/15/2023 10:22 AM    Hemoglobin A1C 8.5 (H) 01/17/2023 09:09 AM    White Blood Cell Count 8.8 07/15/2023 10:22 AM    White Blood Cell Count 8.7 01/17/2023 09:09 AM    Hemoglobin 15.6 07/15/2023 10:22 AM    Hemoglobin 15.7 01/17/2023 09:09 AM    HCT 46.2 07/15/2023 10:22 AM    HCT 46.3 01/17/2023 09:09 AM    MCV 91 07/15/2023 10:22 AM    MCV 89 01/17/2023 09:09 AM    Platelet Count 798 40/45/8062 10:22 AM    Platelet Count 822 06/08/7600 09:09 AM    BUN 15 07/15/2023 10:22 AM    BUN 12 01/17/2023 09:09 AM    Potassium 4.1 07/15/2023 10:22 AM    Potassium 4.1 01/17/2023 09:09 AM    Chloride 101 07/15/2023 10:22 AM    Chloride 100 01/17/2023 09:09 AM    CO2 26 07/15/2023 10:22 AM    CO2 23 01/17/2023 09:09 AM    Creatinine 0.68 07/15/2023 10:22 AM    Creatinine 0.71 01/17/2023 09:09 AM    AST 20 07/15/2023 10:22 AM    AST 18 01/17/2023 09:09 AM    ALT 22 07/15/2023 10:22 AM    ALT 23 01/17/2023 09:09 AM    Protein, Total 6.8 07/15/2023 10:22 AM    Protein, Total 6.9 01/17/2023 09:09 AM    Albumin 4.4 07/15/2023 10:22 AM    Albumin 4.5 01/17/2023 09:09 AM    Globulin, Total 2.4 07/15/2023 10:22 AM    Globulin, Total 2.4 01/17/2023 09:09 AM    HDL 47 01/17/2023 09:09 AM    Triglycerides 383 (H) 01/17/2023 09:09 AM     Portions of the record may have been created with voice recognition software. Occasional wrong word or "sound a like" substitutions may have occurred due to the inherent limitations of voice recognition software. Read the chart carefully and recognize, using context, where substitutions have occurred.

## 2023-11-26 LAB
C PEPTIDE SERPL-MCNC: 2.4 NG/ML (ref 1.1–4.4)
GAD65 AB SER-ACNC: <5 U/ML (ref 0–5)
GLUCOSE P FAST SERPL-MCNC: 126 MG/DL (ref 70–99)
INSULIN AB SER-ACNC: <5 UU/ML
ISLET CELL512 AB SER-ACNC: <7.5 U/ML
T4 FREE SERPL-MCNC: 1.77 NG/DL (ref 0.82–1.77)
TSH SERPL DL<=0.005 MIU/L-ACNC: 0.16 UIU/ML (ref 0.45–4.5)

## 2023-12-06 DIAGNOSIS — I10 ESSENTIAL HYPERTENSION: ICD-10-CM

## 2023-12-07 DIAGNOSIS — E11.8 TYPE 2 DIABETES MELLITUS WITH COMPLICATION, WITHOUT LONG-TERM CURRENT USE OF INSULIN (HCC): ICD-10-CM

## 2023-12-07 RX ORDER — LOSARTAN POTASSIUM 50 MG/1
50 TABLET ORAL DAILY
Qty: 90 TABLET | Refills: 1 | Status: SHIPPED | OUTPATIENT
Start: 2023-12-07

## 2023-12-13 DIAGNOSIS — E11.8 TYPE 2 DIABETES MELLITUS WITH COMPLICATION, WITHOUT LONG-TERM CURRENT USE OF INSULIN (HCC): ICD-10-CM

## 2024-01-24 ENCOUNTER — TELEPHONE (OUTPATIENT)
Dept: ENDOCRINOLOGY | Facility: HOSPITAL | Age: 59
End: 2024-01-24

## 2024-02-01 ENCOUNTER — TELEPHONE (OUTPATIENT)
Dept: ENDOCRINOLOGY | Facility: HOSPITAL | Age: 59
End: 2024-02-01

## 2024-02-02 ENCOUNTER — TELEPHONE (OUTPATIENT)
Dept: ENDOCRINOLOGY | Facility: HOSPITAL | Age: 59
End: 2024-02-02

## 2024-02-02 NOTE — TELEPHONE ENCOUNTER
Load of her freestyle srinivas is that she is relatively controlled with 96% in target range.  For now, continue current regimen.

## 2024-02-11 DIAGNOSIS — E78.5 HYPERLIPIDEMIA, UNSPECIFIED HYPERLIPIDEMIA TYPE: ICD-10-CM

## 2024-02-14 RX ORDER — ATORVASTATIN CALCIUM 40 MG/1
40 TABLET, FILM COATED ORAL DAILY
Qty: 90 TABLET | Refills: 1 | Status: SHIPPED | OUTPATIENT
Start: 2024-02-14

## 2024-03-02 LAB
ALBUMIN SERPL-MCNC: 4.5 G/DL (ref 3.8–4.9)
ALBUMIN/GLOB SERPL: 2 {RATIO} (ref 1.2–2.2)
ALP SERPL-CCNC: 71 IU/L (ref 44–121)
ALT SERPL-CCNC: 16 IU/L (ref 0–32)
AST SERPL-CCNC: 17 IU/L (ref 0–40)
BASOPHILS # BLD AUTO: 0 X10E3/UL (ref 0–0.2)
BASOPHILS NFR BLD AUTO: 0 %
BILIRUB SERPL-MCNC: 0.4 MG/DL (ref 0–1.2)
BUN SERPL-MCNC: 19 MG/DL (ref 6–24)
BUN/CREAT SERPL: 30 (ref 9–23)
CALCIUM SERPL-MCNC: 9.9 MG/DL (ref 8.7–10.2)
CHLORIDE SERPL-SCNC: 101 MMOL/L (ref 96–106)
CO2 SERPL-SCNC: 25 MMOL/L (ref 20–29)
CREAT SERPL-MCNC: 0.63 MG/DL (ref 0.57–1)
EGFR: 102 ML/MIN/1.73
EOSINOPHIL # BLD AUTO: 0.1 X10E3/UL (ref 0–0.4)
EOSINOPHIL NFR BLD AUTO: 2 %
ERYTHROCYTE [DISTWIDTH] IN BLOOD BY AUTOMATED COUNT: 13 % (ref 11.7–15.4)
EST. AVERAGE GLUCOSE BLD GHB EST-MCNC: 160 MG/DL
GLOBULIN SER-MCNC: 2.3 G/DL (ref 1.5–4.5)
GLUCOSE SERPL-MCNC: 112 MG/DL (ref 70–99)
HBA1C MFR BLD: 7.2 % (ref 4.8–5.6)
HCT VFR BLD AUTO: 43.7 % (ref 34–46.6)
HGB BLD-MCNC: 14.5 G/DL (ref 11.1–15.9)
IMM GRANULOCYTES # BLD: 0 X10E3/UL (ref 0–0.1)
IMM GRANULOCYTES NFR BLD: 0 %
LYMPHOCYTES # BLD AUTO: 2.7 X10E3/UL (ref 0.7–3.1)
LYMPHOCYTES NFR BLD AUTO: 29 %
MCH RBC QN AUTO: 30.7 PG (ref 26.6–33)
MCHC RBC AUTO-ENTMCNC: 33.2 G/DL (ref 31.5–35.7)
MCV RBC AUTO: 92 FL (ref 79–97)
MONOCYTES # BLD AUTO: 0.5 X10E3/UL (ref 0.1–0.9)
MONOCYTES NFR BLD AUTO: 6 %
NEUTROPHILS # BLD AUTO: 6.1 X10E3/UL (ref 1.4–7)
NEUTROPHILS NFR BLD AUTO: 63 %
PLATELET # BLD AUTO: 252 X10E3/UL (ref 150–450)
POTASSIUM SERPL-SCNC: 4 MMOL/L (ref 3.5–5.2)
PROT SERPL-MCNC: 6.8 G/DL (ref 6–8.5)
RBC # BLD AUTO: 4.73 X10E6/UL (ref 3.77–5.28)
SODIUM SERPL-SCNC: 141 MMOL/L (ref 134–144)
T4 FREE SERPL-MCNC: 1.88 NG/DL (ref 0.82–1.77)
TSH SERPL DL<=0.005 MIU/L-ACNC: 1.29 UIU/ML (ref 0.45–4.5)
WBC # BLD AUTO: 9.6 X10E3/UL (ref 3.4–10.8)

## 2024-03-22 ENCOUNTER — OFFICE VISIT (OUTPATIENT)
Dept: ENDOCRINOLOGY | Facility: HOSPITAL | Age: 59
End: 2024-03-22
Payer: COMMERCIAL

## 2024-03-22 VITALS
BODY MASS INDEX: 30.4 KG/M2 | WEIGHT: 165.2 LBS | DIASTOLIC BLOOD PRESSURE: 90 MMHG | HEART RATE: 96 BPM | SYSTOLIC BLOOD PRESSURE: 120 MMHG | HEIGHT: 62 IN

## 2024-03-22 DIAGNOSIS — E03.8 HYPOTHYROIDISM DUE TO HASHIMOTO'S THYROIDITIS: ICD-10-CM

## 2024-03-22 DIAGNOSIS — E06.3 HYPOTHYROIDISM DUE TO HASHIMOTO'S THYROIDITIS: ICD-10-CM

## 2024-03-22 DIAGNOSIS — I10 ESSENTIAL HYPERTENSION: ICD-10-CM

## 2024-03-22 DIAGNOSIS — E11.8 TYPE 2 DIABETES MELLITUS WITH COMPLICATION, WITHOUT LONG-TERM CURRENT USE OF INSULIN (HCC): Primary | ICD-10-CM

## 2024-03-22 DIAGNOSIS — E78.49 OTHER HYPERLIPIDEMIA: ICD-10-CM

## 2024-03-22 PROCEDURE — 99214 OFFICE O/P EST MOD 30 MIN: CPT | Performed by: NURSE PRACTITIONER

## 2024-03-22 PROCEDURE — 95251 CONT GLUC MNTR ANALYSIS I&R: CPT | Performed by: NURSE PRACTITIONER

## 2024-03-22 NOTE — PATIENT INSTRUCTIONS
Be mindful of diet.     Stay active and stay hydrated.     Continue Actos and Jardiance at current dose.     Continue Trulicity 4.5 mg weekly.     Contact the office with any issues, side effects or episodes of consistent hypoglycemia.     Continue to utilize the Freestyle Skyler 2.     Check your blood sugars 4X daily and send a record to the office in 2 weeks for review, as discussed.     Continue Synthroid 150 mcg daily.     Be sure to wait 30 minutes before eating and move multivitamin to either dinner or bedtime.     Continue losartan and atorvastatin.     Complete lab work as prescribed.

## 2024-03-22 NOTE — PROGRESS NOTES
Etelvina Rose 58 y.o. female MRN: [id number]    Encounter: [id number]      Assessment/Plan     Assessment:  This is a 58 y.o.-year-old female with type 2 diabetes, hypothyroidism, hypertension and hyperlipidemia.      Plan:  1. Type 2 diabetes:  Her most recent hemoglobin A1c is improved to 7.2. Download of her most recent Freestyle Skyler report reveals that she is relatively controlled throughout the day.  For now, she will continue current regimen. Discussed the importance of a proper diabetic diet, checking blood sugars regularly and exercise. She will check her blood sugars 4 times daily and send a record to the office in 1-2 weeks for review.  Reviewed recognition and proper treatment of hypoglycemic episodes.  Check hemoglobin A1c prior to next visit.     2. Hypothyroidism: Sh and free T4 are normal..   She will continue Synthroid 150 mcg daily.  Check TSH and free T4 prior to next visit.         3. Hypertension: Continue losartan.  Check comprehensive metabolic panel prior to next visit.     4. Hyperlipidemia:  Continue atorvastatin.  Discussed the importance of consistency and compliance with her medication.    CC: Type 2 Diabetes follow up    History of Present Illness     HPI:  58 y.o. female with type 2 diabetes for 10 years.  She is on oral agents at home and takes Actos 45 mg daily, Trulicity 4.5 mg weekly and Jardiance 25 mg daily.  Her most recent hemoglobin A1c POCT from March 1, 2024 is 7.2.  She denies any polyuria, polydipsia, nocturia and blurry vision.  She denies neuropathy, nephropathy and retinopathy.  She states that she did not tolerate metformin or Januvia in the past.  She is recuperating from a fall with bilateral wrist and left foot fractures on December 10, 2023.        Hypoglycemic episodes: None.      Most recent diabetic eye exam was obtained on October 22, 2020 with mild stable retinopathy in the right eye. She has no complaints about her feet and does not follow Podiatry for  regular diabetic foot care.  Diabetic foot exam was performed at office visit on November 22, 2023.      Blood Sugar/Glucometer/Pump/CGM review:  Jamison Holland was reviewed from March 1 through March 14, 2023 she is in target range 81 % with 19 % elevated readings and no hypoglycemia.  Unfortunately, the CGM is active 66% of the time.  Average glucose is 149 with a glucose variability of 21.3.     For hyperlipidemia takes atorvastatin 40 mg daily.       For hypothyroidism she takes levothyroxine 150 mcg daily. She complains of some fatigue.  Her most recent TSH from March 1, 2024 is 1.290 with a free T4 of 1.88. She states that she has been compliant with her levothyroxine and taking it in the proper manner.     For hypertension she takes losartan 50 mg daily.      Review of Systems   Constitutional: Negative.  Negative for chills, fatigue and fever.   HENT: Negative.  Negative for trouble swallowing and voice change.    Eyes: Negative.  Negative for photophobia, pain, discharge, redness, itching and visual disturbance.   Respiratory: Negative.  Negative for chest tightness and shortness of breath.    Cardiovascular: Negative.  Negative for chest pain.   Gastrointestinal: Negative.  Negative for abdominal pain, constipation, diarrhea and vomiting.   Endocrine: Negative for cold intolerance, heat intolerance, polydipsia, polyphagia and polyuria.   Genitourinary: Negative.    Musculoskeletal: Negative.    Skin: Negative.    Allergic/Immunologic: Negative.    Neurological: Negative.  Negative for dizziness, syncope, light-headedness and headaches.   Hematological: Negative.    Psychiatric/Behavioral: Negative.     All other systems reviewed and are negative.      Historical Information   History reviewed. No pertinent past medical history.  History reviewed. No pertinent surgical history.  Social History   Social History     Substance and Sexual Activity   Alcohol Use Not Currently     Social History     Substance  and Sexual Activity   Drug Use Never     Social History     Tobacco Use   Smoking Status Every Day    Current packs/day: 0.50    Average packs/day: 0.5 packs/day for 30.0 years (15.0 ttl pk-yrs)    Types: Cigarettes   Smokeless Tobacco Never   Tobacco Comments    working on quitting, goes a few days without it and then has some emotional smoking      Family History:   Family History   Problem Relation Age of Onset    Schizoaffective Disorder  Mother     Diabetes unspecified Father     Hyperlipidemia Father     Hypertension Father        Meds/Allergies   Current Outpatient Medications   Medication Sig Dispense Refill    Accu-Chek Softclix Lancets lancets Test blood sugar twice daily. 200 each 3    atorvastatin (LIPITOR) 40 mg tablet TAKE 1 TABLET DAILY 90 tablet 1    Continuous Blood Gluc Sensor (FreeStyle Skyler 2 Sensor) MISC Use 1 application. 4 (four) times a day (before meals and at bedtime) 6 each 2    dulaglutide (Trulicity) 4.5 MG/0.5ML injection Inject 0.5 mL (4.5 mg total) under the skin every 7 days 2 mL 6    Empagliflozin (Jardiance) 25 MG TABS Take 1 tablet (25 mg total) by mouth every morning 90 tablet 1    glucose blood (Accu-Chek Guide) test strip Test blood sugars twice daily. 200 each 3    LORazepam (ATIVAN) 0.5 mg tablet   0    losartan (COZAAR) 50 mg tablet TAKE 1 TABLET DAILY 90 tablet 1    Multiple Vitamin (MULTIVITAMIN) capsule Take 1 capsule by mouth daily      pioglitazone (ACTOS) 45 mg tablet Take 1 tablet (45 mg total) by mouth daily 90 tablet 1    Synthroid 150 MCG tablet Take 1 tablet Mon-Sat.  No tablet Sun. (Patient taking differently: Take 150 mcg by mouth daily) 90 tablet 1    zolpidem (AMBIEN) 10 mg tablet       Blood Glucose Monitoring Suppl (Accu-Chek Guide) w/Device KIT by Does not apply route once for 1 dose Use glucometer to check blood sugars daily. 1 kit 0    Continuous Blood Gluc  (FreeStyle Skyler 14 Day [place]) JACOB Use 1 Device 4 (four) times a day (before meals and  "at bedtime) 1 Device 0    glimepiride (AMARYL) 1 mg tablet Take 1 tablet with breakfast and dinner (Patient not taking: Reported on 11/22/2023) 180 tablet 3     No current facility-administered medications for this visit.     No Known Allergies    Objective   Vitals: Blood pressure 120/90, pulse 96, height 5' 2\" (1.575 m), weight 74.9 kg (165 lb 3.2 oz).    Physical Exam  Vitals reviewed.   Constitutional:       Appearance: She is well-developed.   HENT:      Head: Normocephalic and atraumatic.   Eyes:      Conjunctiva/sclera: Conjunctivae normal.      Pupils: Pupils are equal, round, and reactive to light.   Cardiovascular:      Rate and Rhythm: Normal rate and regular rhythm.      Pulses: no weak pulses.           Dorsalis pedis pulses are 1+ on the right side and 1+ on the left side.        Posterior tibial pulses are 1+ on the right side and 1+ on the left side.      Heart sounds: Normal heart sounds.   Pulmonary:      Effort: Pulmonary effort is normal.      Breath sounds: Normal breath sounds.   Abdominal:      General: Bowel sounds are normal.      Palpations: Abdomen is soft.   Musculoskeletal:         General: Normal range of motion.      Cervical back: Normal range of motion and neck supple.   Feet:      Right foot:      Skin integrity: No ulcer, skin breakdown, erythema, warmth, callus or dry skin.      Left foot:      Skin integrity: No ulcer, skin breakdown, erythema, warmth, callus or dry skin.   Skin:     General: Skin is warm and dry.   Neurological:      Mental Status: She is alert and oriented to person, place, and time.   Psychiatric:         Behavior: Behavior normal.         Thought Content: Thought content normal.         Judgment: Judgment normal.       Patient's shoes and socks removed.    Right Foot/Ankle   Right Foot Inspection  Skin Exam: skin normal and skin intact. No dry skin, no warmth, no callus, no erythema, no maceration, no abnormal color, no pre-ulcer, no ulcer and no callus. "     Toe Exam: ROM and strength within normal limits.     Sensory   Monofilament testing: intact    Vascular  Capillary refills: < 3 seconds  The right DP pulse is 1+. The right PT pulse is 1+.     Left Foot/Ankle  Left Foot Inspection  Skin Exam: skin normal and skin intact. No dry skin, no warmth, no erythema, no maceration, normal color, no pre-ulcer, no ulcer and no callus.     Toe Exam: ROM and strength within normal limits.     Sensory   Monofilament testing: intact    Vascular  Capillary refills: < 3 seconds  The left DP pulse is 1+. The left PT pulse is 1+.     Assign Risk Category  No deformity present  No loss of protective sensation  No weak pulses  Risk: 0      Lab Results:   Lab Results   Component Value Date/Time    Hemoglobin A1C 7.2 (H) 03/01/2024 10:39 AM    Hemoglobin A1C 8.1 (A) 11/22/2023 10:18 AM    Hemoglobin A1C 9.8 (H) 07/15/2023 10:22 AM    White Blood Cell Count 9.6 03/01/2024 10:39 AM    White Blood Cell Count 8.8 07/15/2023 10:22 AM    Hemoglobin 14.5 03/01/2024 10:39 AM    Hemoglobin 15.6 07/15/2023 10:22 AM    HCT 43.7 03/01/2024 10:39 AM    HCT 46.2 07/15/2023 10:22 AM    MCV 92 03/01/2024 10:39 AM    MCV 91 07/15/2023 10:22 AM    Platelet Count 252 03/01/2024 10:39 AM    Platelet Count 254 07/15/2023 10:22 AM    BUN 19 03/01/2024 10:39 AM    BUN 15 07/15/2023 10:22 AM    Potassium 4.0 03/01/2024 10:39 AM    Potassium 4.1 07/15/2023 10:22 AM    Chloride 101 03/01/2024 10:39 AM    Chloride 101 07/15/2023 10:22 AM    CO2 25 03/01/2024 10:39 AM    CO2 26 07/15/2023 10:22 AM    Creatinine 0.63 03/01/2024 10:39 AM    Creatinine 0.68 07/15/2023 10:22 AM    AST 17 03/01/2024 10:39 AM    AST 20 07/15/2023 10:22 AM    ALT 16 03/01/2024 10:39 AM    ALT 22 07/15/2023 10:22 AM    Protein, Total 6.8 03/01/2024 10:39 AM    Protein, Total 6.8 07/15/2023 10:22 AM    Albumin 4.5 03/01/2024 10:39 AM    Albumin 4.4 07/15/2023 10:22 AM    Globulin, Total 2.3 03/01/2024 10:39 AM    Globulin, Total 2.4  "07/15/2023 10:22 AM       Portions of the record may have been created with voice recognition software. Occasional wrong word or \"sound a like\" substitutions may have occurred due to the inherent limitations of voice recognition software. Read the chart carefully and recognize, using context, where substitutions have occurred.    "

## 2024-04-01 DIAGNOSIS — E03.9 HYPOTHYROIDISM, UNSPECIFIED TYPE: ICD-10-CM

## 2024-04-01 DIAGNOSIS — E11.8 TYPE 2 DIABETES MELLITUS WITH COMPLICATION, WITHOUT LONG-TERM CURRENT USE OF INSULIN (HCC): ICD-10-CM

## 2024-04-01 RX ORDER — LEVOTHYROXINE SODIUM 150 MCG
TABLET ORAL
Qty: 90 TABLET | Refills: 1 | Status: SHIPPED | OUTPATIENT
Start: 2024-04-01

## 2024-04-01 RX ORDER — PIOGLITAZONEHYDROCHLORIDE 45 MG/1
45 TABLET ORAL DAILY
Qty: 90 TABLET | Refills: 1 | Status: SHIPPED | OUTPATIENT
Start: 2024-04-01

## 2024-04-01 NOTE — TELEPHONE ENCOUNTER
Reason for call:   [x] Refill   [] Prior Auth  [] Other:     Office:   [] PCP/Provider -   [x] Specialty/Provider - Endo    Medication:     Quantity: 90    Pharmacy: [facility] - [facility] - [facility] [phone]     Does the patient have enough for 3 days?   [x] Yes   [] No - Send as HP to POD

## 2024-05-15 ENCOUNTER — TELEPHONE (OUTPATIENT)
Dept: ENDOCRINOLOGY | Facility: HOSPITAL | Age: 59
End: 2024-05-15

## 2024-05-15 NOTE — TELEPHONE ENCOUNTER
Received fax from [facility] stating that the Trulicity is on backorder. I left message advising her to call around to different pharmacies that she is willing to travel to and see if they have it in stock.

## 2024-05-22 ENCOUNTER — TELEPHONE (OUTPATIENT)
Age: 59
End: 2024-05-22

## 2024-05-22 NOTE — TELEPHONE ENCOUNTER
Patient calling requesting malickdusty ambriz look at her CGM readings. She feels like since she went back to work and has increased activity her blood sugars are lower. Please call patient back with recommendations.    Information: Selecting Yes will display possible errors in your note based on the variables you have selected. This validation is only offered as a suggestion for you. PLEASE NOTE THAT THE VALIDATION TEXT WILL BE REMOVED WHEN YOU FINALIZE YOUR NOTE. IF YOU WANT TO FAX A PRELIMINARY NOTE YOU WILL NEED TO TOGGLE THIS TO 'NO' IF YOU DO NOT WANT IT IN YOUR FAXED NOTE.

## 2024-05-23 NOTE — TELEPHONE ENCOUNTER
She is having some lower blood sugars throughout the day and especially overnight.  For now, lets discontinue the Actos and see if the Jardiance and Trulicity keeps her blood sugars controlled without going low.

## 2024-05-24 NOTE — TELEPHONE ENCOUNTER
Patient returning call. Made aware:    HUGO De Souza   to [facility] [place] [facility]      5/23/24  5:40 PM  Note     She is having some lower blood sugars throughout the day and especially overnight.  For now, lets discontinue the Actos and see if the Jardiance and Trulicity keeps her blood sugars controlled without going low.        Patient verbalized understanding.

## 2024-06-06 ENCOUNTER — TELEPHONE (OUTPATIENT)
Age: 59
End: 2024-06-06

## 2024-06-06 DIAGNOSIS — E11.8 TYPE 2 DIABETES MELLITUS WITH COMPLICATION, WITHOUT LONG-TERM CURRENT USE OF INSULIN (HCC): Primary | ICD-10-CM

## 2024-06-06 NOTE — TELEPHONE ENCOUNTER
Patient called- she wanted to know what we could do about her Trulicity? She called local pharmacies and no one has it in stock. I explained her to that there was nothing we could do until they get back in stock, and we do not have any answers as to when that might be.    Are there any alternatives to this medication? Please advise.

## 2024-06-24 DIAGNOSIS — E11.8 TYPE 2 DIABETES MELLITUS WITH COMPLICATION, WITHOUT LONG-TERM CURRENT USE OF INSULIN (HCC): ICD-10-CM

## 2024-06-24 NOTE — TELEPHONE ENCOUNTER
Patient called in said that she is on vacation and forgot to bring her Skyler 2 sensors.  Asks if 1 sensor can be sent in to the [facility]. Please advise and call patient.

## 2024-07-19 DIAGNOSIS — E11.8 TYPE 2 DIABETES MELLITUS WITH COMPLICATION, WITHOUT LONG-TERM CURRENT USE OF INSULIN (HCC): ICD-10-CM

## 2024-07-19 NOTE — TELEPHONE ENCOUNTER
Continuous Glucose Sensor (FreeStyle Skyler 2 Sensor[place] [facility] [place]     [facility]

## 2024-08-04 DIAGNOSIS — I10 ESSENTIAL HYPERTENSION: ICD-10-CM

## 2024-08-05 RX ORDER — LOSARTAN POTASSIUM 50 MG/1
50 TABLET ORAL DAILY
Qty: 100 TABLET | Refills: 1 | Status: SHIPPED | OUTPATIENT
Start: 2024-08-05

## 2024-08-23 DIAGNOSIS — E11.8 TYPE 2 DIABETES MELLITUS WITH COMPLICATION, WITHOUT LONG-TERM CURRENT USE OF INSULIN (HCC): Primary | ICD-10-CM

## 2024-09-03 DIAGNOSIS — E11.8 TYPE 2 DIABETES MELLITUS WITH COMPLICATION, WITHOUT LONG-TERM CURRENT USE OF INSULIN (HCC): ICD-10-CM

## 2024-09-04 RX ORDER — EMPAGLIFLOZIN 25 MG/1
25 TABLET, FILM COATED ORAL EVERY MORNING
Qty: 90 TABLET | Refills: 1 | Status: SHIPPED | OUTPATIENT
Start: 2024-09-04

## 2024-09-10 LAB — HBA1C MFR BLD HPLC: 9.3 %

## 2024-09-13 ENCOUNTER — OFFICE VISIT (OUTPATIENT)
Dept: ENDOCRINOLOGY | Facility: HOSPITAL | Age: 59
End: 2024-09-13
Payer: COMMERCIAL

## 2024-09-13 VITALS
SYSTOLIC BLOOD PRESSURE: 124 MMHG | WEIGHT: 163.8 LBS | DIASTOLIC BLOOD PRESSURE: 90 MMHG | HEIGHT: 62 IN | HEART RATE: 94 BPM | BODY MASS INDEX: 30.14 KG/M2

## 2024-09-13 DIAGNOSIS — E78.5 HYPERLIPIDEMIA, UNSPECIFIED HYPERLIPIDEMIA TYPE: ICD-10-CM

## 2024-09-13 DIAGNOSIS — E03.8 HYPOTHYROIDISM DUE TO HASHIMOTO'S THYROIDITIS: ICD-10-CM

## 2024-09-13 DIAGNOSIS — I10 ESSENTIAL HYPERTENSION: ICD-10-CM

## 2024-09-13 DIAGNOSIS — E11.8 TYPE 2 DIABETES MELLITUS WITH COMPLICATION, WITHOUT LONG-TERM CURRENT USE OF INSULIN (HCC): Primary | ICD-10-CM

## 2024-09-13 DIAGNOSIS — E03.9 HYPOTHYROIDISM, UNSPECIFIED TYPE: ICD-10-CM

## 2024-09-13 DIAGNOSIS — E06.3 HYPOTHYROIDISM DUE TO HASHIMOTO'S THYROIDITIS: ICD-10-CM

## 2024-09-13 DIAGNOSIS — E78.49 OTHER HYPERLIPIDEMIA: ICD-10-CM

## 2024-09-13 PROCEDURE — 99214 OFFICE O/P EST MOD 30 MIN: CPT | Performed by: NURSE PRACTITIONER

## 2024-09-13 PROCEDURE — 95251 CONT GLUC MNTR ANALYSIS I&R: CPT | Performed by: NURSE PRACTITIONER

## 2024-09-13 RX ORDER — PIOGLITAZONEHYDROCHLORIDE 15 MG/1
15 TABLET ORAL DAILY
Qty: 90 TABLET | Refills: 3 | Status: SHIPPED | OUTPATIENT
Start: 2024-09-13

## 2024-09-13 RX ORDER — BLOOD-GLUCOSE SENSOR
EACH MISCELLANEOUS
Qty: 2 EACH | Refills: 6 | Status: SHIPPED | OUTPATIENT
Start: 2024-09-13

## 2024-09-13 RX ORDER — BLOOD-GLUCOSE SENSOR
EACH MISCELLANEOUS
Qty: 2 EACH | Refills: 6 | Status: SHIPPED | OUTPATIENT
Start: 2024-09-13 | End: 2024-09-13 | Stop reason: SDUPTHER

## 2024-09-13 NOTE — PROGRESS NOTES
Etelvina Rose 58 y.o. female MRN: [id number]    Encounter: [id number]      Assessment & Plan     Assessment:  This is a 58 y.o.-year-old female with type 2 diabetes, hypothyroidism, hypertension and hyperlipidemia.      Plan:  1. Type 2 diabetes:  Her most recent hemoglobin A1c is elevated at 9.3.  She states that he has not been very active.  download of her most recent Freestyle Skyler 2 is sporadic with an activity time of 34%.  For this reason, I have asked her to upgrade to a freestyle skyler 3.  For now, she will continue current regimen but will restart pioglitazone at a 15 mg dose. Discussed the importance of a proper diabetic diet, checking blood sugars regularly and exercise. She will the office so a freestyle skyler 3 be generated in 1-2 weeks for review.  Reviewed recognition and proper treatment of hypoglycemic episodes.  Check hemoglobin A1c prior to next visit.     2. Hypothyroidism: TSH and free T4 are normal.   She will continue Synthroid 150 mcg daily.  Check TSH and free T4 prior to next visit.         3. Hypertension: Continue losartan.  Check comprehensive metabolic panel prior to next visit.     4. Hyperlipidemia:  Triglycerides are elevated. Discussed the relationship between hyperglycemia and hypertriglyceridemia. Continue atorvastatin.  Discussed the importance of consistency and compliance with her medication.    CC: Type 2 Diabetes follow up    History of Present Illness     HPI:  58 y.o. female with type 2 diabetes for 10 years.  She is on oral agents at home and takes Ozempic 2 mg weekly and Jardiance 25 mg daily.  Her most recent hemoglobin A1c from September 10, 2024 is 9.3. She denies any polyuria, polydipsia, nocturia and blurry vision.  She denies neuropathy, nephropathy and retinopathy.  She states that she did not tolerate metformin or Januvia in the past.  She is recuperating from a fall with bilateral wrist and left foot fractures on December 10, 2023.        Hypoglycemic  episodes: None.      Most recent diabetic eye exam was obtained on October 22, 2020 with mild stable retinopathy in the right eye. She has no complaints about her feet and does not follow Podiatry for regular diabetic foot care.  Diabetic foot exam was performed at office visit on March 22, 2024.      Blood Sugar/Glucometer/Pump/CGM review:  Freestyle Skyler was reviewed from August 30 through September 12, 2024 she is in target range 51% with 49% elevated readings and no hypoglycemia.  Unfortunately, the CGM is active 34% of the time.  Average glucose is 182 with a glucose variability of 16.4.     For hyperlipidemia takes atorvastatin 40 mg daily.       For hypothyroidism she takes levothyroxine 150 mcg - 5 days a week with 1/2 tablet 2 days a week. She complains of some fatigue.  Her most recent TSH from September 10, 2024 is 0.510 with a free T4 of 1.58. She states that she has been compliant with her levothyroxine and taking it in the proper manner.     For hypertension she takes losartan 50 mg daily.      Review of Systems   Constitutional: Negative.  Negative for chills, fatigue and fever.   HENT: Negative.  Negative for trouble swallowing and voice change.    Eyes: Negative.  Negative for photophobia, pain, discharge, redness, itching and visual disturbance.   Respiratory: Negative.  Negative for chest tightness and shortness of breath.    Cardiovascular: Negative.  Negative for chest pain.   Gastrointestinal: Negative.  Negative for abdominal pain, constipation, diarrhea and vomiting.   Endocrine: Negative for cold intolerance, heat intolerance, polydipsia, polyphagia and polyuria.   Genitourinary: Negative.    Musculoskeletal: Negative.    Skin: Negative.    Allergic/Immunologic: Negative.    Neurological: Negative.  Negative for dizziness, syncope, light-headedness and headaches.   Hematological: Negative.    Psychiatric/Behavioral: Negative.     All other systems reviewed and are negative.      Historical  Information   History reviewed. No pertinent past medical history.  History reviewed. No pertinent surgical history.  Social History   Social History     Substance and Sexual Activity   Alcohol Use Not Currently     Social History     Substance and Sexual Activity   Drug Use Never     Social History     Tobacco Use   Smoking Status Every Day    Current packs/day: 0.50    Average packs/day: 0.5 packs/day for 30.0 years (15.0 ttl pk-yrs)    Types: Cigarettes   Smokeless Tobacco Never   Tobacco Comments    working on quitting, goes a few days without it and then has some emotional smoking      Family History:   Family History   Problem Relation Age of Onset    Schizoaffective Disorder  Mother     Diabetes unspecified Father     Hyperlipidemia Father     Hypertension Father        Meds/Allergies   Current Outpatient Medications   Medication Sig Dispense Refill    Accu-Chek Softclix Lancets lancets Test blood sugar twice daily. 200 each 3    atorvastatin (LIPITOR) 40 mg tablet TAKE 1 TABLET DAILY 90 tablet 1    Blood Glucose Monitoring Suppl (Accu-Chek Guide) w/Device KIT by Does not apply route once for 1 dose Use glucometer to check blood sugars daily. 1 kit 0    Continuous Blood Gluc  (FreeStyle Sykler 14 Day [place]) JACOB Use 1 Device 4 (four) times a day (before meals and at bedtime) 1 Device 0    Continuous Glucose Sensor (FreeStyle Skyler 2 Sensor) MISC Use 1 application. 4 (four) times a day (before meals and at bedtime) 6 each 1    glimepiride (AMARYL) 1 mg tablet Take 1 tablet with breakfast and dinner (Patient not taking: Reported on 11/22/2023) 180 tablet 3    glucose blood (Accu-Chek Guide) test strip Test blood sugars twice daily. 200 each 3    Jardiance 25 MG TABS TAKE 1 TABLET EVERY MORNING 90 tablet 1    LORazepam (ATIVAN) 0.5 mg tablet   0    losartan (COZAAR) 50 mg tablet TAKE 1 TABLET DAILY 100 tablet 1    Multiple Vitamin (MULTIVITAMIN) capsule Take 1 capsule by mouth daily      pioglitazone  (ACTOS) 45 mg tablet Take 1 tablet (45 mg total) by mouth daily 90 tablet 1    semaglutide, 2 mg/dose, (Ozempic, 2 MG/DOSE,) 8 mg/ mL injection pen Inject 0.75 mL (2 mg total) under the skin every 7 days 9 mL 0    Synthroid 150 MCG tablet Take 1 tablet Mon-Sat.  No tablet Sun. 90 tablet 1    zolpidem (AMBIEN) 10 mg tablet        No current facility-administered medications for this visit.     No Known Allergies    Objective   Vitals: There were no vitals taken for this visit.    Physical Exam  Vitals reviewed.   Constitutional:       Appearance: She is well-developed.   HENT:      Head: Normocephalic and atraumatic.   Eyes:      Conjunctiva/sclera: Conjunctivae normal.      Pupils: Pupils are equal, round, and reactive to light.   Cardiovascular:      Rate and Rhythm: Normal rate and regular rhythm.      Heart sounds: Normal heart sounds.   Pulmonary:      Effort: Pulmonary effort is normal.      Breath sounds: Normal breath sounds.   Abdominal:      General: Bowel sounds are normal.      Palpations: Abdomen is soft.   Musculoskeletal:         General: Normal range of motion.      Cervical back: Normal range of motion and neck supple.   Skin:     General: Skin is warm and dry.   Neurological:      Mental Status: She is alert and oriented to person, place, and time.   Psychiatric:         Behavior: Behavior normal.         Thought Content: Thought content normal.         Judgment: Judgment normal.       Lab Results:   Lab Results   Component Value Date/Time    Hemoglobin A1C 9.3 09/10/2024 12:00 AM    Hemoglobin A1C 7.2 (H) 03/01/2024 10:39 AM    Hemoglobin A1C 8.1 (A) 11/22/2023 10:18 AM    White Blood Cell Count 9.6 03/01/2024 10:39 AM    Hemoglobin 14.5 03/01/2024 10:39 AM    HCT 43.7 03/01/2024 10:39 AM    MCV 92 03/01/2024 10:39 AM    Platelet Count 252 03/01/2024 10:39 AM    BUN 19 03/01/2024 10:39 AM    Potassium 4.0 03/01/2024 10:39 AM    Chloride 101 03/01/2024 10:39 AM    CO2 25 03/01/2024 10:39 AM     "Creatinine 0.63 03/01/2024 10:39 AM    AST 17 03/01/2024 10:39 AM    ALT 16 03/01/2024 10:39 AM    Protein, Total 6.8 03/01/2024 10:39 AM    Albumin 4.5 03/01/2024 10:39 AM    Globulin, Total 2.3 03/01/2024 10:39 AM       Portions of the record may have been created with voice recognition software. Occasional wrong word or \"sound a like\" substitutions may have occurred due to the inherent limitations of voice recognition software. Read the chart carefully and recognize, using context, where substitutions have occurred.    "

## 2024-09-13 NOTE — PATIENT INSTRUCTIONS
Be mindful of diet.     Stay active and stay hydrated.     Continue Jardiance at current dose.     Continue Ozempic 2 mg weekly.    Restart pioglitazone 15 mg daily.     Contact the office with any issues, side effects or episodes of consistent hypoglycemia.     Upgrade to the Freestyle Skyler 3.     Call the office in 2 weeks for Skyler report review, as discussed.     Continue Synthroid 150 mcg Monday through Friday with a half dose on Saturday and Sunday.     Be sure to wait 30 minutes before eating and move multivitamin to either dinner or bedtime.     Continue losartan and atorvastatin.     Complete lab work as prescribed.

## 2024-09-15 DIAGNOSIS — E78.5 HYPERLIPIDEMIA, UNSPECIFIED HYPERLIPIDEMIA TYPE: ICD-10-CM

## 2024-09-17 RX ORDER — ATORVASTATIN CALCIUM 40 MG/1
40 TABLET, FILM COATED ORAL DAILY
Qty: 30 TABLET | Refills: 0 | Status: SHIPPED | OUTPATIENT
Start: 2024-09-17

## 2024-09-17 NOTE — TELEPHONE ENCOUNTER
Patient needs updated blood work and has previously placed orders. Please contact patient to go for labs. Courtesy refill provided.    Lipid panel need to be completed

## 2024-09-25 DIAGNOSIS — E11.8 TYPE 2 DIABETES MELLITUS WITH COMPLICATION, WITHOUT LONG-TERM CURRENT USE OF INSULIN (HCC): ICD-10-CM

## 2024-09-25 RX ORDER — BLOOD-GLUCOSE SENSOR
EACH MISCELLANEOUS
Qty: 2 EACH | Refills: 6 | Status: SHIPPED | OUTPATIENT
Start: 2024-09-25

## 2024-09-25 NOTE — TELEPHONE ENCOUNTER
Patient said pharmacy did not receive prescription    Reason for call:   [x] Refill   [] Prior Auth  [] Other:     Office:   [] PCP/Provider -   [x] Specialty/Provider - Endo    Medication: Continuous Glucose Sensor (FreeStyle Skyler 3 Sensor) MISC     Dose/Frequency: 1 sensor every 14 days,     Quantity: 2    Pharmacy: [id number] - [place] - [phone]      Does the patient have enough for 3 days?   [] Yes   [x] No - Send as HP to POD

## 2024-10-14 DIAGNOSIS — E78.5 HYPERLIPIDEMIA, UNSPECIFIED HYPERLIPIDEMIA TYPE: ICD-10-CM

## 2024-10-15 RX ORDER — ATORVASTATIN CALCIUM 40 MG/1
40 TABLET, FILM COATED ORAL DAILY
Qty: 90 TABLET | Refills: 1 | Status: SHIPPED | OUTPATIENT
Start: 2024-10-15

## 2024-10-23 DIAGNOSIS — E11.8 TYPE 2 DIABETES MELLITUS WITH COMPLICATION, WITHOUT LONG-TERM CURRENT USE OF INSULIN (HCC): Primary | ICD-10-CM

## 2024-10-23 NOTE — TELEPHONE ENCOUNTER
Patient is unable to get Skyler 3 sensors. She would like a script sent for Continuous Glucose Sensor (FreeStyle Skyler 2 Sensor) to [id number] - [place] - [id number]

## 2024-12-01 DIAGNOSIS — E11.8 TYPE 2 DIABETES MELLITUS WITH COMPLICATION, WITHOUT LONG-TERM CURRENT USE OF INSULIN (HCC): ICD-10-CM

## 2024-12-01 DIAGNOSIS — E03.9 HYPOTHYROIDISM, UNSPECIFIED TYPE: ICD-10-CM

## 2024-12-03 RX ORDER — LEVOTHYROXINE SODIUM 150 MCG
TABLET ORAL
Qty: 78 TABLET | Refills: 1 | Status: SHIPPED | OUTPATIENT
Start: 2024-12-03

## 2024-12-03 RX ORDER — SEMAGLUTIDE 2.68 MG/ML
INJECTION, SOLUTION SUBCUTANEOUS
Qty: 9 ML | Refills: 0 | Status: SHIPPED | OUTPATIENT
Start: 2024-12-03

## 2025-03-25 DIAGNOSIS — E11.8 TYPE 2 DIABETES MELLITUS WITH COMPLICATION, WITHOUT LONG-TERM CURRENT USE OF INSULIN (HCC): ICD-10-CM

## 2025-03-26 RX ORDER — SEMAGLUTIDE 2.68 MG/ML
INJECTION, SOLUTION SUBCUTANEOUS
Qty: 9 ML | Refills: 0 | Status: SHIPPED | OUTPATIENT
Start: 2025-03-26

## 2025-03-30 DIAGNOSIS — E78.5 HYPERLIPIDEMIA, UNSPECIFIED HYPERLIPIDEMIA TYPE: ICD-10-CM

## 2025-03-30 DIAGNOSIS — I10 ESSENTIAL HYPERTENSION: ICD-10-CM

## 2025-03-31 RX ORDER — LOSARTAN POTASSIUM 50 MG/1
50 TABLET ORAL DAILY
Qty: 90 TABLET | Refills: 1 | Status: SHIPPED | OUTPATIENT
Start: 2025-03-31

## 2025-03-31 RX ORDER — ATORVASTATIN CALCIUM 40 MG/1
40 TABLET, FILM COATED ORAL DAILY
Qty: 90 TABLET | Refills: 1 | Status: SHIPPED | OUTPATIENT
Start: 2025-03-31

## 2025-04-01 ENCOUNTER — RESULTS FOLLOW-UP (OUTPATIENT)
Dept: ENDOCRINOLOGY | Facility: HOSPITAL | Age: 60
End: 2025-04-01

## 2025-04-01 LAB — HBA1C MFR BLD HPLC: 8 %

## 2025-04-04 ENCOUNTER — OFFICE VISIT (OUTPATIENT)
Dept: ENDOCRINOLOGY | Facility: HOSPITAL | Age: 60
End: 2025-04-04
Payer: COMMERCIAL

## 2025-04-04 VITALS
OXYGEN SATURATION: 97 % | SYSTOLIC BLOOD PRESSURE: 118 MMHG | HEART RATE: 90 BPM | HEIGHT: 62 IN | BODY MASS INDEX: 30 KG/M2 | DIASTOLIC BLOOD PRESSURE: 70 MMHG | WEIGHT: 163 LBS

## 2025-04-04 DIAGNOSIS — E11.8 TYPE 2 DIABETES MELLITUS WITH COMPLICATION, WITHOUT LONG-TERM CURRENT USE OF INSULIN (HCC): Primary | ICD-10-CM

## 2025-04-04 DIAGNOSIS — E06.3 HYPOTHYROIDISM DUE TO HASHIMOTO'S THYROIDITIS: ICD-10-CM

## 2025-04-04 DIAGNOSIS — E78.49 OTHER HYPERLIPIDEMIA: ICD-10-CM

## 2025-04-04 DIAGNOSIS — I10 ESSENTIAL HYPERTENSION: ICD-10-CM

## 2025-04-04 DIAGNOSIS — E78.5 HYPERLIPIDEMIA, UNSPECIFIED HYPERLIPIDEMIA TYPE: ICD-10-CM

## 2025-04-04 DIAGNOSIS — E03.9 HYPOTHYROIDISM, UNSPECIFIED TYPE: ICD-10-CM

## 2025-04-04 PROCEDURE — 95251 CONT GLUC MNTR ANALYSIS I&R: CPT | Performed by: NURSE PRACTITIONER

## 2025-04-04 PROCEDURE — 99214 OFFICE O/P EST MOD 30 MIN: CPT | Performed by: NURSE PRACTITIONER

## 2025-04-04 RX ORDER — PIOGLITAZONE 30 MG/1
30 TABLET ORAL DAILY
Qty: 90 TABLET | Refills: 3 | Status: SHIPPED | OUTPATIENT
Start: 2025-04-04

## 2025-04-04 NOTE — PROGRESS NOTES
Name: Etelvina Rose      : 1965      MRN: [id number]  Encounter Provider: HUGO De Souza  Encounter Date: 2025   Encounter department: [facility]      :  Assessment & Plan  Type 2 diabetes mellitus with complication, without long-term current use of insulin (HCC)    Lab Results   Component Value Date    HGBA1C 8.0 2025       Orders:    pioglitazone (ACTOS) 30 mg tablet; Take 1 tablet (30 mg total) by mouth daily    CBC and differential; Future    Comprehensive metabolic panel; Future    Hemoglobin A1C; Future    T4, free; Future    TSH, 3rd generation; Future    Lipid panel; Future    Albumin / creatinine urine ratio; Future    Hypothyroidism due to Hashimoto's thyroiditis    Orders:    CBC and differential; Future    Comprehensive metabolic panel; Future    Hemoglobin A1C; Future    T4, free; Future    TSH, 3rd generation; Future    Lipid panel; Future    Albumin / creatinine urine ratio; Future    Hypothyroidism, unspecified type    Orders:    CBC and differential; Future    Comprehensive metabolic panel; Future    Hemoglobin A1C; Future    T4, free; Future    TSH, 3rd generation; Future    Lipid panel; Future    Albumin / creatinine urine ratio; Future    Essential hypertension    Orders:    CBC and differential; Future    Comprehensive metabolic panel; Future    Hemoglobin A1C; Future    T4, free; Future    TSH, 3rd generation; Future    Lipid panel; Future    Albumin / creatinine urine ratio; Future    Hyperlipidemia, unspecified hyperlipidemia type    Orders:    CBC and differential; Future    Comprehensive metabolic panel; Future    Hemoglobin A1C; Future    T4, free; Future    TSH, 3rd generation; Future    Lipid panel; Future    Albumin / creatinine urine ratio; Future    Other hyperlipidemia    Orders:    CBC and differential; Future    Comprehensive metabolic panel; Future    Hemoglobin A1C; Future    T4, free; Future    TSH, 3rd  generation; Future    Lipid panel; Future    Albumin / creatinine urine ratio; Future    Plan:  1. Type 2 diabetes:  Her most recent hemoglobin A1c is improved to 8.0 but remains above goal.  She states that he has not been very active.  download of her most recent Freestyle Skyler 2 is sporadic with an activity time of 34%.  For this reason, I have asked her to upgrade to a freestyle skyler 3.  For now, she will continue current regimen but will increase pioglitazone to 30 mg dose. Discussed the importance of a proper diabetic diet, checking blood sugars regularly and exercise. She will the office so a freestyle skyler 3 be generated in 1-2 weeks for review.  Reviewed recognition and proper treatment of hypoglycemic episodes.  Check hemoglobin A1c prior to next visit.     2. Hypothyroidism: TSH and free T4 are normal.   She will continue Synthroid 150 mcg daily.  Check TSH and free T4 prior to next visit.         3. Hypertension: Continue losartan.  Check comprehensive metabolic panel prior to next visit.     4. Hyperlipidemia:  Continue atorvastatin.  Discussed the importance of consistency and compliance with her medication.  Check fasting lipid panel prior to next visit.    History of Present Illness     59 y.o. female with type 2 diabetes for 10 years.  She is on oral agents at home and takes Ozempic 2 mg weekly and Jardiance 25 mg daily, pioglitazone 15 mg daily.  Her most recent hemoglobin A1c from April 1, 2025  is 8.0. She denies any polyuria, polydipsia, nocturia and blurry vision. She denies neuropathy, nephropathy and retinopathy.  She states that she did not tolerate metformin or Januvia in the past.  She is recuperating from a fall with bilateral wrist and left foot fractures on December 10, 2023.        Hypoglycemic episodes: None.      Most recent diabetic eye exam was obtained on October 22, 2020 with mild stable retinopathy in the right eye. She has no complaints about her feet and does not follow  Podiatry for regular diabetic foot care.  Diabetic foot exam was performed at office visit on March 22, 2024.      Blood Sugar/Glucometer/Pump/CGM review:  Jamison Holland was reviewed from March 22 through April 3, 2024 revealing an average glucose of 153 with a glucose variability of 20.6.  She is in target range 81% of the time with 19% elevated readings and no hypoglycemia on this report.     For hyperlipidemia takes atorvastatin 40 mg daily.       For hypothyroidism she takes levothyroxine 150 mcg - 5 days a week with 1/2 tablet 2 days a week. She complains of some fatigue.  Her most recent TSH from April 1, 2025 is 0.775 with a free T4 of 1.67. She states that she has been compliant with her levothyroxine and taking it in the proper manner.     For hypertension she takes losartan 50 mg daily.       Last Eye Exam: Not on file  Last Foot Exam: 03/22/2024  Health Maintenance   Topic Date Due    Diabetic Eye Exam  10/22/2022    Diabetic Foot Exam  03/22/2025           Review of Systems   Constitutional: Negative.  Negative for chills, fatigue and fever.   HENT: Negative.  Negative for trouble swallowing and voice change.    Eyes: Negative.  Negative for photophobia, pain, discharge, redness, itching and visual disturbance.   Respiratory: Negative.  Negative for chest tightness and shortness of breath.    Cardiovascular: Negative.  Negative for chest pain.   Gastrointestinal: Negative.  Negative for abdominal pain, constipation, diarrhea and vomiting.   Endocrine: Negative for cold intolerance, heat intolerance, polydipsia, polyphagia and polyuria.   Genitourinary: Negative.    Musculoskeletal: Negative.    Skin: Negative.    Allergic/Immunologic: Negative.    Neurological: Negative.  Negative for dizziness, syncope, light-headedness and headaches.   Hematological: Negative.    Psychiatric/Behavioral: Negative.     All other systems reviewed and are negative.   as per HPI      Objective   /70   Pulse 90    "Ht 5' 2\" (1.575 m)   Wt 73.9 kg (163 lb)   SpO2 97%   BMI 29.81 kg/m²      Body mass index is 29.81 kg/m².  Wt Readings from Last 3 Encounters:   04/04/25 73.9 kg (163 lb)   09/13/24 74.3 kg (163 lb 12.8 oz)   03/22/24 74.9 kg (165 lb 3.2 oz)     Physical Exam  Vitals reviewed.   Constitutional:       Appearance: She is well-developed.   HENT:      Head: Normocephalic and atraumatic.   Eyes:      Conjunctiva/sclera: Conjunctivae normal.      Pupils: Pupils are equal, round, and reactive to light.   Cardiovascular:      Rate and Rhythm: Normal rate and regular rhythm.      Pulses: no weak pulses.           Dorsalis pedis pulses are 1+ on the right side and 1+ on the left side.        Posterior tibial pulses are 1+ on the right side and 1+ on the left side.      Heart sounds: Normal heart sounds.   Pulmonary:      Effort: Pulmonary effort is normal.      Breath sounds: Normal breath sounds.   Abdominal:      General: Bowel sounds are normal.      Palpations: Abdomen is soft.   Musculoskeletal:         General: Normal range of motion.      Cervical back: Normal range of motion and neck supple.   Feet:      Right foot:      Skin integrity: No ulcer, skin breakdown, erythema, warmth, callus or dry skin.      Left foot:      Skin integrity: No ulcer, skin breakdown, erythema, warmth, callus or dry skin.   Skin:     General: Skin is warm and dry.   Neurological:      Mental Status: She is alert and oriented to person, place, and time.   Psychiatric:         Behavior: Behavior normal.         Thought Content: Thought content normal.         Judgment: Judgment normal.     Patient's shoes and socks removed.    Right Foot/Ankle   Right Foot Inspection  Skin Exam: skin normal and skin intact. No dry skin, no warmth, no callus, no erythema, no maceration, no abnormal color, no pre-ulcer, no ulcer and no callus.     Toe Exam: ROM and strength within normal limits.     Sensory   Monofilament testing: " "intact    Vascular  Capillary refills: < 3 seconds  The right DP pulse is 1+. The right PT pulse is 1+.     Left Foot/Ankle  Left Foot Inspection  Skin Exam: skin normal and skin intact. No dry skin, no warmth, no erythema, no maceration, normal color, no pre-ulcer, no ulcer and no callus.     Toe Exam: ROM and strength within normal limits.     Sensory   Monofilament testing: intact    Vascular  Capillary refills: < 3 seconds  The left DP pulse is 1+. The left PT pulse is 1+.     Assign Risk Category  No deformity present  No loss of protective sensation  No weak pulses  Risk: 0      Labs:   Lab Results   Component Value Date    HGBA1C 8.0 04/01/2025    HGBA1C 9.3 09/10/2024    HGBA1C 7.2 (H) 03/01/2024     Lab Results   Component Value Date    CREATININE 0.63 03/01/2024    CREATININE 0.68 07/15/2023    CREATININE 0.71 01/17/2023    BUN 19 03/01/2024    K 4.0 03/01/2024     03/01/2024    CO2 25 03/01/2024     eGFR   Date Value Ref Range Status   03/01/2024 102 >59 mL/min/1.73 Final     Lab Results   Component Value Date    HDL 47 01/17/2023    TRIG 383 (H) 01/17/2023     Lab Results   Component Value Date    ALT 16 03/01/2024    AST 17 03/01/2024     No results found for: \"[id number]\"    There are no Patient Instructions on file for this visit.    Discussed with the patient and all questioned fully answered. She will call me if any problems arise.      "

## 2025-04-04 NOTE — PATIENT INSTRUCTIONS
Be mindful of diet.     Stay active and stay hydrated.     Continue Jardiance at current dose.     Continue Ozempic 2 mg weekly.     Increase pioglitazone to 30 mg daily.     Contact the office with any issues, side effects or episodes of consistent hypoglycemia.     Upgrade to the Freestyle Skyler 3.     Call the office in 2 weeks for Skyler report review, as discussed.     Continue Synthroid 150 mcg Monday through Friday with a half dose on Saturday and Sunday.     Be sure to wait 30 minutes before eating and move multivitamin to either dinner or bedtime.     Continue losartan and atorvastatin.     Complete lab work as prescribed.

## 2025-04-04 NOTE — ASSESSMENT & PLAN NOTE
Lab Results   Component Value Date    HGBA1C 8.0 04/01/2025       Orders:    pioglitazone (ACTOS) 30 mg tablet; Take 1 tablet (30 mg total) by mouth daily    CBC and differential; Future    Comprehensive metabolic panel; Future    Hemoglobin A1C; Future    T4, free; Future    TSH, 3rd generation; Future    Lipid panel; Future    Albumin / creatinine urine ratio; Future

## 2025-04-04 NOTE — ASSESSMENT & PLAN NOTE
Orders:    CBC and differential; Future    Comprehensive metabolic panel; Future    Hemoglobin A1C; Future    T4, free; Future    TSH, 3rd generation; Future    Lipid panel; Future    Albumin / creatinine urine ratio; Future

## 2025-06-01 DIAGNOSIS — E11.8 TYPE 2 DIABETES MELLITUS WITH COMPLICATION, WITHOUT LONG-TERM CURRENT USE OF INSULIN (HCC): ICD-10-CM

## 2025-06-02 RX ORDER — SEMAGLUTIDE 2.68 MG/ML
INJECTION, SOLUTION SUBCUTANEOUS
Qty: 9 ML | Refills: 1 | Status: SHIPPED | OUTPATIENT
Start: 2025-06-02

## 2025-07-07 DIAGNOSIS — E11.8 TYPE 2 DIABETES MELLITUS WITH COMPLICATION, WITHOUT LONG-TERM CURRENT USE OF INSULIN (HCC): ICD-10-CM

## 2025-07-07 DIAGNOSIS — E03.9 HYPOTHYROIDISM, UNSPECIFIED TYPE: ICD-10-CM

## 2025-07-08 RX ORDER — LEVOTHYROXINE SODIUM 150 MCG
TABLET ORAL
Qty: 78 TABLET | Refills: 1 | Status: SHIPPED | OUTPATIENT
Start: 2025-07-08

## 2025-07-08 RX ORDER — EMPAGLIFLOZIN 25 MG/1
25 TABLET, FILM COATED ORAL EVERY MORNING
Qty: 90 TABLET | Refills: 1 | Status: SHIPPED | OUTPATIENT
Start: 2025-07-08

## 2025-07-21 ENCOUNTER — TELEPHONE (OUTPATIENT)
Age: 60
End: 2025-07-21

## 2025-07-21 NOTE — TELEPHONE ENCOUNTER
Patient called for out of Skyler sensor for 3 weeks now due to sensor unavailable at the pharmacy. Called pharmacy sensor is ready for  today with 74.99 copay . Patient express understanding .  No further action needed.

## 2025-07-26 LAB
ALBUMIN SERPL-MCNC: 4.3 G/DL (ref 3.8–4.9)
ALBUMIN/CREAT UR: 11 MG/G CREAT (ref 0–29)
ALP SERPL-CCNC: 70 IU/L (ref 44–121)
ALT SERPL-CCNC: 11 IU/L (ref 0–32)
AST SERPL-CCNC: 15 IU/L (ref 0–40)
BASOPHILS # BLD AUTO: 0.1 X10E3/UL (ref 0–0.2)
BASOPHILS NFR BLD AUTO: 1 %
BILIRUB SERPL-MCNC: 0.7 MG/DL (ref 0–1.2)
BUN SERPL-MCNC: 17 MG/DL (ref 8–27)
BUN/CREAT SERPL: 25 (ref 12–28)
CALCIUM SERPL-MCNC: 9.6 MG/DL (ref 8.7–10.3)
CHLORIDE SERPL-SCNC: 103 MMOL/L (ref 96–106)
CHOLEST SERPL-MCNC: 140 MG/DL (ref 100–199)
CO2 SERPL-SCNC: 22 MMOL/L (ref 20–29)
CREAT SERPL-MCNC: 0.69 MG/DL (ref 0.57–1)
CREAT UR-MCNC: 47.2 MG/DL
EGFR: 99 ML/MIN/1.73
EOSINOPHIL # BLD AUTO: 0.2 X10E3/UL (ref 0–0.4)
EOSINOPHIL NFR BLD AUTO: 2 %
ERYTHROCYTE [DISTWIDTH] IN BLOOD BY AUTOMATED COUNT: 13.4 % (ref 11.7–15.4)
GLOBULIN SER-MCNC: 2.3 G/DL (ref 1.5–4.5)
GLUCOSE SERPL-MCNC: 129 MG/DL (ref 70–99)
HBA1C MFR BLD: 8.2 % (ref 4.8–5.6)
HCT VFR BLD AUTO: 46.7 % (ref 34–46.6)
HDLC SERPL-MCNC: 50 MG/DL
HGB BLD-MCNC: 15.3 G/DL (ref 11.1–15.9)
IMM GRANULOCYTES # BLD: 0 X10E3/UL (ref 0–0.1)
IMM GRANULOCYTES NFR BLD: 0 %
LDLC SERPL CALC-MCNC: 63 MG/DL (ref 0–99)
LYMPHOCYTES # BLD AUTO: 2.8 X10E3/UL (ref 0.7–3.1)
LYMPHOCYTES NFR BLD AUTO: 21 %
MCH RBC QN AUTO: 30.7 PG (ref 26.6–33)
MCHC RBC AUTO-ENTMCNC: 32.8 G/DL (ref 31.5–35.7)
MCV RBC AUTO: 94 FL (ref 79–97)
MICROALBUMIN UR-MCNC: 5.4 UG/ML
MONOCYTES # BLD AUTO: 0.6 X10E3/UL (ref 0.1–0.9)
MONOCYTES NFR BLD AUTO: 5 %
NEUTROPHILS # BLD AUTO: 9.5 X10E3/UL (ref 1.4–7)
NEUTROPHILS NFR BLD AUTO: 71 %
PLATELET # BLD AUTO: 248 X10E3/UL (ref 150–450)
POTASSIUM SERPL-SCNC: 4.2 MMOL/L (ref 3.5–5.2)
PROT SERPL-MCNC: 6.6 G/DL (ref 6–8.5)
RBC # BLD AUTO: 4.99 X10E6/UL (ref 3.77–5.28)
SL AMB VLDL CHOLESTEROL CALC: 27 MG/DL (ref 5–40)
SODIUM SERPL-SCNC: 144 MMOL/L (ref 134–144)
T4 FREE SERPL-MCNC: 1.52 NG/DL (ref 0.82–1.77)
TRIGL SERPL-MCNC: 157 MG/DL (ref 0–149)
TSH SERPL DL<=0.005 MIU/L-ACNC: 1.73 UIU/ML (ref 0.45–4.5)
WBC # BLD AUTO: 13.3 X10E3/UL (ref 3.4–10.8)

## 2025-08-01 ENCOUNTER — OFFICE VISIT (OUTPATIENT)
Dept: ENDOCRINOLOGY | Facility: HOSPITAL | Age: 60
End: 2025-08-01
Payer: COMMERCIAL

## 2025-08-01 VITALS
SYSTOLIC BLOOD PRESSURE: 102 MMHG | WEIGHT: 159 LBS | HEIGHT: 62 IN | HEART RATE: 80 BPM | OXYGEN SATURATION: 97 % | BODY MASS INDEX: 29.26 KG/M2 | DIASTOLIC BLOOD PRESSURE: 68 MMHG

## 2025-08-01 DIAGNOSIS — E78.5 HYPERLIPIDEMIA, UNSPECIFIED HYPERLIPIDEMIA TYPE: ICD-10-CM

## 2025-08-01 DIAGNOSIS — I10 ESSENTIAL HYPERTENSION: ICD-10-CM

## 2025-08-01 DIAGNOSIS — E11.8 TYPE 2 DIABETES MELLITUS WITH COMPLICATION, WITHOUT LONG-TERM CURRENT USE OF INSULIN (HCC): Primary | ICD-10-CM

## 2025-08-01 DIAGNOSIS — E78.49 OTHER HYPERLIPIDEMIA: ICD-10-CM

## 2025-08-01 DIAGNOSIS — E06.3 HYPOTHYROIDISM DUE TO HASHIMOTO'S THYROIDITIS: ICD-10-CM

## 2025-08-01 DIAGNOSIS — E03.9 HYPOTHYROIDISM, UNSPECIFIED TYPE: ICD-10-CM

## 2025-08-01 PROCEDURE — 95251 CONT GLUC MNTR ANALYSIS I&R: CPT | Performed by: NURSE PRACTITIONER

## 2025-08-01 PROCEDURE — 99214 OFFICE O/P EST MOD 30 MIN: CPT | Performed by: NURSE PRACTITIONER

## 2025-08-01 RX ORDER — PIOGLITAZONE 45 MG/1
45 TABLET ORAL DAILY
Qty: 90 TABLET | Refills: 3 | Status: SHIPPED | OUTPATIENT
Start: 2025-08-01

## 2025-08-04 ENCOUNTER — TELEPHONE (OUTPATIENT)
Dept: ADMINISTRATIVE | Facility: OTHER | Age: 60
End: 2025-08-04

## 2025-08-11 ENCOUNTER — TELEPHONE (OUTPATIENT)
Age: 60
End: 2025-08-11

## 2025-08-20 DIAGNOSIS — E11.8 TYPE 2 DIABETES MELLITUS WITH COMPLICATION, WITHOUT LONG-TERM CURRENT USE OF INSULIN (HCC): ICD-10-CM

## 2025-08-20 RX ORDER — ACYCLOVIR 800 MG/1
TABLET ORAL
Qty: 2 EACH | Refills: 6 | Status: SHIPPED | OUTPATIENT
Start: 2025-08-20